# Patient Record
Sex: MALE | URBAN - METROPOLITAN AREA
[De-identification: names, ages, dates, MRNs, and addresses within clinical notes are randomized per-mention and may not be internally consistent; named-entity substitution may affect disease eponyms.]

---

## 2018-02-06 ENCOUNTER — NEW REFERRAL (OUTPATIENT)
Dept: URBAN - METROPOLITAN AREA CLINIC 27 | Facility: CLINIC | Age: 58
End: 2018-02-06

## 2018-02-06 DIAGNOSIS — H25.093: ICD-10-CM

## 2018-02-06 DIAGNOSIS — H43.392: ICD-10-CM

## 2018-02-06 DIAGNOSIS — E11.9: ICD-10-CM

## 2018-02-06 DIAGNOSIS — H33.001: ICD-10-CM

## 2018-02-06 DIAGNOSIS — H52.13: ICD-10-CM

## 2018-02-06 DIAGNOSIS — H43.811: ICD-10-CM

## 2018-02-06 DIAGNOSIS — H40.013: ICD-10-CM

## 2018-02-06 PROCEDURE — 99245 OFF/OP CONSLTJ NEW/EST HI 55: CPT

## 2018-02-06 PROCEDURE — 92225 OPHTHALMOSCOPY (INITIAL): CPT

## 2018-02-06 ASSESSMENT — TONOMETRY
OS_IOP_MMHG: 20
OD_IOP_MMHG: 17

## 2018-02-06 ASSESSMENT — VISUAL ACUITY
OS_CC: 20/20-
OD_CC: CF 5FT

## 2018-02-08 ENCOUNTER — SURGERY/PROCEDURE (OUTPATIENT)
Dept: URBAN - METROPOLITAN AREA HOSPITAL 7 | Facility: HOSPITAL | Age: 58
End: 2018-02-08

## 2018-02-08 DIAGNOSIS — H33.001: ICD-10-CM

## 2018-02-08 PROCEDURE — 67107 REPAIR DETACHED RETINA: CPT

## 2018-02-09 ENCOUNTER — 1 DAY POST-OP (OUTPATIENT)
Dept: URBAN - METROPOLITAN AREA CLINIC 11 | Facility: CLINIC | Age: 58
End: 2018-02-09

## 2018-02-09 DIAGNOSIS — Z98.890: ICD-10-CM

## 2018-02-09 DIAGNOSIS — H33.001: ICD-10-CM

## 2018-02-09 DIAGNOSIS — H43.392: ICD-10-CM

## 2018-02-09 DIAGNOSIS — H43.811: ICD-10-CM

## 2018-02-09 PROCEDURE — 99024 POSTOP FOLLOW-UP VISIT: CPT

## 2018-02-09 ASSESSMENT — VISUAL ACUITY: OD_CC: CF 5FT

## 2018-02-09 ASSESSMENT — TONOMETRY: OD_IOP_MMHG: 22

## 2018-02-13 ENCOUNTER — POST-OP CHECK (OUTPATIENT)
Dept: URBAN - METROPOLITAN AREA CLINIC 27 | Facility: CLINIC | Age: 58
End: 2018-02-13

## 2018-02-13 DIAGNOSIS — Z98.890: ICD-10-CM

## 2018-02-13 DIAGNOSIS — H43.811: ICD-10-CM

## 2018-02-13 DIAGNOSIS — H43.392: ICD-10-CM

## 2018-02-13 DIAGNOSIS — H33.001: ICD-10-CM

## 2018-02-13 PROCEDURE — 99024 POSTOP FOLLOW-UP VISIT: CPT

## 2018-02-13 ASSESSMENT — TONOMETRY: OD_IOP_MMHG: 20

## 2018-02-13 ASSESSMENT — VISUAL ACUITY: OD_CC: CF 6FT

## 2018-02-20 ENCOUNTER — POST-OP CHECK (OUTPATIENT)
Dept: URBAN - METROPOLITAN AREA CLINIC 27 | Facility: CLINIC | Age: 58
End: 2018-02-20

## 2018-02-20 DIAGNOSIS — H33.001: ICD-10-CM

## 2018-02-20 DIAGNOSIS — H43.811: ICD-10-CM

## 2018-02-20 DIAGNOSIS — Z98.890: ICD-10-CM

## 2018-02-20 DIAGNOSIS — H43.392: ICD-10-CM

## 2018-02-20 PROCEDURE — 99024 POSTOP FOLLOW-UP VISIT: CPT

## 2018-02-20 ASSESSMENT — TONOMETRY: OD_IOP_MMHG: 17

## 2018-02-20 ASSESSMENT — VISUAL ACUITY: OD_CC: CF 6FT

## 2018-03-06 ENCOUNTER — POST-OP CHECK (OUTPATIENT)
Dept: URBAN - METROPOLITAN AREA CLINIC 27 | Facility: CLINIC | Age: 58
End: 2018-03-06

## 2018-03-06 DIAGNOSIS — H43.392: ICD-10-CM

## 2018-03-06 DIAGNOSIS — H43.811: ICD-10-CM

## 2018-03-06 DIAGNOSIS — Z98.890: ICD-10-CM

## 2018-03-06 DIAGNOSIS — H33.001: ICD-10-CM

## 2018-03-06 PROCEDURE — 99024 POSTOP FOLLOW-UP VISIT: CPT

## 2018-03-06 ASSESSMENT — TONOMETRY: OD_IOP_MMHG: 18

## 2018-03-06 ASSESSMENT — VISUAL ACUITY: OD_CC: 20/400

## 2018-04-10 ENCOUNTER — POST-OP CHECK (OUTPATIENT)
Dept: URBAN - METROPOLITAN AREA CLINIC 27 | Facility: CLINIC | Age: 58
End: 2018-04-10

## 2018-04-10 DIAGNOSIS — H43.392: ICD-10-CM

## 2018-04-10 DIAGNOSIS — H33.001: ICD-10-CM

## 2018-04-10 DIAGNOSIS — H43.811: ICD-10-CM

## 2018-04-10 PROCEDURE — 99024 POSTOP FOLLOW-UP VISIT: CPT

## 2018-04-10 ASSESSMENT — TONOMETRY: OD_IOP_MMHG: 21

## 2018-04-10 ASSESSMENT — VISUAL ACUITY: OD_CC: 20/100

## 2018-07-10 ENCOUNTER — 3 MONTH EXAM (OUTPATIENT)
Dept: URBAN - METROPOLITAN AREA CLINIC 27 | Facility: CLINIC | Age: 58
End: 2018-07-10

## 2018-07-10 DIAGNOSIS — H43.392: ICD-10-CM

## 2018-07-10 DIAGNOSIS — H43.811: ICD-10-CM

## 2018-07-10 DIAGNOSIS — H33.001: ICD-10-CM

## 2018-07-10 PROCEDURE — 92014 COMPRE OPH EXAM EST PT 1/>: CPT

## 2018-07-10 ASSESSMENT — VISUAL ACUITY
OS_CC: 20/20-2
OD_CC: 20/50-3

## 2018-07-10 ASSESSMENT — TONOMETRY
OS_IOP_MMHG: 20
OD_IOP_MMHG: 18

## 2018-10-23 ENCOUNTER — 3 MONTH EXAM (OUTPATIENT)
Dept: URBAN - METROPOLITAN AREA CLINIC 27 | Facility: CLINIC | Age: 58
End: 2018-10-23

## 2018-10-23 DIAGNOSIS — E11.9: ICD-10-CM

## 2018-10-23 DIAGNOSIS — H43.811: ICD-10-CM

## 2018-10-23 DIAGNOSIS — H33.001: ICD-10-CM

## 2018-10-23 DIAGNOSIS — H43.392: ICD-10-CM

## 2018-10-23 PROCEDURE — 92012 INTRM OPH EXAM EST PATIENT: CPT

## 2018-10-23 ASSESSMENT — VISUAL ACUITY
OD_CC: 20/40
OS_CC: 20/20

## 2018-10-23 ASSESSMENT — TONOMETRY
OD_IOP_MMHG: 19
OS_IOP_MMHG: 21

## 2019-04-23 ENCOUNTER — 6 MONTH FOLLOW UP (OUTPATIENT)
Dept: URBAN - METROPOLITAN AREA CLINIC 27 | Facility: CLINIC | Age: 59
End: 2019-04-23

## 2019-04-23 DIAGNOSIS — H43.811: ICD-10-CM

## 2019-04-23 DIAGNOSIS — E11.9: ICD-10-CM

## 2019-04-23 DIAGNOSIS — H52.13: ICD-10-CM

## 2019-04-23 DIAGNOSIS — H25.093: ICD-10-CM

## 2019-04-23 DIAGNOSIS — H33.001: ICD-10-CM

## 2019-04-23 DIAGNOSIS — H40.013: ICD-10-CM

## 2019-04-23 DIAGNOSIS — H43.392: ICD-10-CM

## 2019-04-23 PROCEDURE — 92134 CPTRZ OPH DX IMG PST SGM RTA: CPT

## 2019-04-23 PROCEDURE — 92226 OPHTHALMOSCOPY (SUB): CPT

## 2019-04-23 PROCEDURE — 92014 COMPRE OPH EXAM EST PT 1/>: CPT

## 2019-04-23 ASSESSMENT — TONOMETRY
OD_IOP_MMHG: 20
OS_IOP_MMHG: 21

## 2019-04-23 ASSESSMENT — VISUAL ACUITY
OD_CC: 20/30+1
OS_CC: 20/25+2

## 2020-07-28 ENCOUNTER — FOLLOW UP (OUTPATIENT)
Dept: URBAN - METROPOLITAN AREA CLINIC 27 | Facility: CLINIC | Age: 60
End: 2020-07-28

## 2020-07-28 DIAGNOSIS — E11.9: ICD-10-CM

## 2020-07-28 DIAGNOSIS — H25.093: ICD-10-CM

## 2020-07-28 DIAGNOSIS — H40.013: ICD-10-CM

## 2020-07-28 DIAGNOSIS — H52.13: ICD-10-CM

## 2020-07-28 DIAGNOSIS — H33.001: ICD-10-CM

## 2020-07-28 DIAGNOSIS — H43.392: ICD-10-CM

## 2020-07-28 DIAGNOSIS — H43.811: ICD-10-CM

## 2020-07-28 PROCEDURE — 92014 COMPRE OPH EXAM EST PT 1/>: CPT

## 2020-07-28 PROCEDURE — 92201 OPSCPY EXTND RTA DRAW UNI/BI: CPT

## 2020-07-28 ASSESSMENT — VISUAL ACUITY
OS_CC: 20/20
OD_CC: 20/30

## 2020-07-28 ASSESSMENT — TONOMETRY
OS_IOP_MMHG: 19
OD_IOP_MMHG: 19

## 2020-09-26 ENCOUNTER — OFFICE VISIT (OUTPATIENT)
Dept: URGENT CARE | Age: 60
End: 2020-09-26
Payer: COMMERCIAL

## 2020-09-26 VITALS
TEMPERATURE: 97.7 F | OXYGEN SATURATION: 100 % | HEART RATE: 80 BPM | SYSTOLIC BLOOD PRESSURE: 130 MMHG | DIASTOLIC BLOOD PRESSURE: 78 MMHG | WEIGHT: 231 LBS | HEIGHT: 75 IN | BODY MASS INDEX: 28.72 KG/M2 | RESPIRATION RATE: 18 BRPM

## 2020-09-26 DIAGNOSIS — T14.8XXA BRUISED: Primary | ICD-10-CM

## 2020-09-26 PROCEDURE — 99213 OFFICE O/P EST LOW 20 MIN: CPT | Performed by: NURSE PRACTITIONER

## 2020-09-26 RX ORDER — ATORVASTATIN CALCIUM 40 MG
TABLET ORAL
COMMUNITY
Start: 2020-07-23

## 2020-09-26 RX ORDER — METFORMIN HYDROCHLORIDE 500 MG/1
TABLET, EXTENDED RELEASE ORAL
COMMUNITY
Start: 2020-08-14

## 2020-09-26 NOTE — PROGRESS NOTES
[address] [employer] [facility]        NAME: Clay Conrad is a 61 y o  male  : 1960    MRN: [id number]  DATE: 2020  TIME: 6:32 PM    Assessment and Plan   Bruised [place] Blinks  8XXA]  1  Bruised           Patient Instructions     Call PCP in 2 days for further evaluation  If any new s/s, go to the ED  Follow up with PCP in 2 days  Proceed to  ER if symptoms worsen  Chief Complaint     Chief Complaint   Patient presents with    bruise     to left upper leg          History of Present Illness       HPI   Presents to the clinic with c/o bruise lesion on the back of the left knee  Does not remember any trauma  Denies pain or other symptoms  States they have been moving  Not on any blood thinner  No blood in urine or stool    Review of Systems   Review of Systems   Constitutional: Negative for fever  Respiratory: Negative for shortness of breath  Gastrointestinal: Negative for blood in stool, nausea and vomiting  Genitourinary: Negative for hematuria  Neurological: Negative for weakness and numbness  Hematological: Does not bruise/bleed easily  Current Medications       Current Outpatient Medications:     Lipitor 40 MG tablet, , Disp: , Rfl:     metFORMIN (GLUCOPHAGE-XR) 500 mg 24 hr tablet, , Disp: , Rfl:     Current Allergies     Allergies as of 2020    (No Known Allergies)            The following portions of the patient's history were reviewed and updated as appropriate: allergies, current medications, past family history, past medical history, past social history, past surgical history and problem list      Past Medical History:   Diagnosis Date    Cancer ([facility] )     Diabetes mellitus ([facility] )        History reviewed  No pertinent surgical history  History reviewed  No pertinent family history  Medications have been verified          Objective   /78   Pulse 80   Temp 97 7 °F (36 5 °C)   Resp 18   Ht 6' 3" (1 905 m)   Wt 105 kg (231 lb)   SpO2 100%   BMI 28 87 kg/m²        Physical Exam     Physical Exam  Constitutional:       Appearance: He is not ill-appearing  Musculoskeletal:      Comments: Full ROM of the left knee  No decrease in strength of the left knee   Skin:     Findings: Bruising (back of the left knee  Largest diameter about 12 cm  NTTP  No skin break  No drainage or bleeding ) present  Neurological:      Mental Status: He is alert

## 2021-03-10 DIAGNOSIS — Z23 ENCOUNTER FOR IMMUNIZATION: ICD-10-CM

## 2021-08-10 ENCOUNTER — FOLLOW UP (OUTPATIENT)
Dept: URBAN - METROPOLITAN AREA CLINIC 27 | Facility: CLINIC | Age: 61
End: 2021-08-10

## 2021-08-10 DIAGNOSIS — H43.392: ICD-10-CM

## 2021-08-10 DIAGNOSIS — E11.9: ICD-10-CM

## 2021-08-10 DIAGNOSIS — H40.013: ICD-10-CM

## 2021-08-10 DIAGNOSIS — H25.093: ICD-10-CM

## 2021-08-10 DIAGNOSIS — H33.001: ICD-10-CM

## 2021-08-10 DIAGNOSIS — H43.811: ICD-10-CM

## 2021-08-10 DIAGNOSIS — H52.13: ICD-10-CM

## 2021-08-10 PROCEDURE — 92014 COMPRE OPH EXAM EST PT 1/>: CPT

## 2021-08-10 PROCEDURE — 92201 OPSCPY EXTND RTA DRAW UNI/BI: CPT

## 2021-08-10 PROCEDURE — 92134 CPTRZ OPH DX IMG PST SGM RTA: CPT

## 2021-08-10 ASSESSMENT — VISUAL ACUITY
OS_CC: 20/20
OD_CC: 20/25

## 2021-08-10 ASSESSMENT — TONOMETRY
OD_IOP_MMHG: 17
OS_IOP_MMHG: 19

## 2022-08-16 ENCOUNTER — 1 YEAR FOLLOW-UP (OUTPATIENT)
Dept: URBAN - METROPOLITAN AREA CLINIC 27 | Facility: CLINIC | Age: 62
End: 2022-08-16

## 2022-08-16 DIAGNOSIS — H43.392: ICD-10-CM

## 2022-08-16 DIAGNOSIS — H25.093: ICD-10-CM

## 2022-08-16 DIAGNOSIS — H33.001: ICD-10-CM

## 2022-08-16 DIAGNOSIS — H40.013: ICD-10-CM

## 2022-08-16 DIAGNOSIS — E11.9: ICD-10-CM

## 2022-08-16 DIAGNOSIS — H52.13: ICD-10-CM

## 2022-08-16 DIAGNOSIS — H43.811: ICD-10-CM

## 2022-08-16 PROCEDURE — 92014 COMPRE OPH EXAM EST PT 1/>: CPT

## 2022-08-16 PROCEDURE — 92201 OPSCPY EXTND RTA DRAW UNI/BI: CPT

## 2022-08-16 ASSESSMENT — TONOMETRY
OD_IOP_MMHG: 12
OS_IOP_MMHG: 14

## 2022-08-16 ASSESSMENT — VISUAL ACUITY
OD_CC: 20/25-1
OS_CC: 20/25+2

## 2022-10-25 ENCOUNTER — TELEPHONE (OUTPATIENT)
Dept: NEUROLOGY | Facility: CLINIC | Age: 62
End: 2022-10-25

## 2022-11-03 ENCOUNTER — OFFICE VISIT (OUTPATIENT)
Dept: NEUROLOGY | Facility: CLINIC | Age: 62
End: 2022-11-03

## 2022-11-03 VITALS
HEART RATE: 80 BPM | WEIGHT: 249.9 LBS | SYSTOLIC BLOOD PRESSURE: 162 MMHG | BODY MASS INDEX: 31.24 KG/M2 | DIASTOLIC BLOOD PRESSURE: 84 MMHG

## 2022-11-03 DIAGNOSIS — G43.009 MIGRAINE WITHOUT AURA AND WITHOUT STATUS MIGRAINOSUS, NOT INTRACTABLE: Primary | ICD-10-CM

## 2022-11-03 DIAGNOSIS — Z85.810 HISTORY OF TONGUE CANCER: ICD-10-CM

## 2022-11-03 DIAGNOSIS — R51.0 POSITIONAL HEADACHE: ICD-10-CM

## 2022-11-03 RX ORDER — SITAGLIPTIN 100 MG/1
100 TABLET, FILM COATED ORAL DAILY
COMMUNITY
Start: 2022-10-24

## 2022-11-03 RX ORDER — TAMSULOSIN HYDROCHLORIDE 0.4 MG/1
0.4 CAPSULE ORAL DAILY
COMMUNITY
Start: 2022-09-08

## 2022-11-03 RX ORDER — CHOLECALCIFEROL (VITAMIN D3) 25 MCG
TABLET,CHEWABLE ORAL
COMMUNITY
Start: 2018-01-01

## 2022-11-03 RX ORDER — ASPIRIN 81 MG/1
TABLET ORAL
COMMUNITY
Start: 2016-01-01

## 2022-11-03 RX ORDER — ACETAMINOPHEN 160 MG
TABLET,DISINTEGRATING ORAL
COMMUNITY
Start: 2018-01-01

## 2022-11-03 RX ORDER — SODIUM FLUORIDE 5 MG/G
GEL, DENTIFRICE DENTAL
COMMUNITY
Start: 2017-11-01

## 2022-11-03 RX ORDER — LISINOPRIL 10 MG/1
10 TABLET ORAL DAILY
COMMUNITY
Start: 2022-09-01

## 2022-11-03 RX ORDER — MEMANTINE HYDROCHLORIDE 5 MG/1
TABLET ORAL
Qty: 120 TABLET | Refills: 1 | Status: SHIPPED | OUTPATIENT
Start: 2022-11-03

## 2022-11-03 RX ORDER — EFINACONAZOLE 100 MG/ML
SOLUTION TOPICAL
COMMUNITY
Start: 2022-10-18

## 2022-11-03 RX ORDER — ROSUVASTATIN CALCIUM 40 MG/1
40 TABLET, COATED ORAL DAILY
COMMUNITY
Start: 2022-09-01

## 2022-11-03 RX ORDER — AMPICILLIN TRIHYDRATE 250 MG
CAPSULE ORAL
COMMUNITY
Start: 2022-01-01

## 2022-11-03 NOTE — PROGRESS NOTES
[address] [facility]  PATIENT:  Salima Singleton  MRN:  [id number]  :  1960  DATE OF SERVICE:  11/3/2022  REFERRED BY: Self, Referral  PMD: No primary care provider on file  Assessment/Plan:     Salima Singleton is a delightful 58 y o  male with a past medical history that includes diabetes mellitus, HLD and base of tongue cancer s/p chemo and radiation (Base of tongue squamous cell carcinoma status post resection 2017, cisplatin and radiation therapy 2017 and further therapy 2018) referred here for evaluation of headache  Initial evaluation 11/3/2022     Mr Norwood Osler reports a history of headaches that started about 5-6 months ago  He denies any prior history of headaches but reports that since that time he has had a nearly daily headache with significant flare-ups  He reported a significant positional component to the headaches and noted worsening with coughing, sneezing, bending over, or bearing down  He underwent an MRI of the brain with and without contrast at [facility] which did not show any evidence of metastatic disease or primary brain tumor  He also underwent a PET scan that did not show any evidence of neoplastic disease  Certainly the multiple red flags and positional component the headaches are concerning but it is reassuring that he had a normal MRI and normal dilated eye exam   However, I would like to obtain further imaging to evaluate for any vascular lesions  The description of his headaches does have a migrainous quality to them and for that reason I would like to treat him  From an abortive standpoint I will have him try Ubrelvy  I have asked him to reach out to his cardiologist to discuss the use of triptans as the previously mentioned a high calcium score and was being followed for suspected cardiac risks    From a preventive standpoint, I will have him try memantine especially since he reports issues with his memory and is concerned for dementia due to a strong family history of dementia on his mother's side  Ideally I would like him to take 10 mg in the morning and 10 mg in the evening but due to kidney disease since his chemotherapy I will have him discuss an elevated dose with his nephrologist on Monday, otherwise we will attempt to keep him on 5 mg b i d  I have asked him to keep me updated throughout the process and let me know if there are any issues or concerns  Migraine without aura and without status migrainosus, not intractable  -     Ubrogepant (UBRELVY) 100 MG tablet; Take 1 tablet (100 mg) one time as needed for migraine  May repeat one additional tablet (100 mg) at least two hours after the first dose  Do not use more than two doses per day, or for more than eight days per month    -     memantine (NAMENDA) 5 mg tablet; 5 mg PO Daily for 1 week, then 5 mg BID for 1 week, then 5 mg QAM and 10 mg QPM for 1 week; then 10 mg BID  -     MRI angiogram head without contrast; Future  -     MRI angiogram neck with contrast; Future    Positional headache  -     MRI angiogram head without contrast; Future  -     MRI angiogram neck with contrast; Future    Workup:  - Neurologic assessment reveals unremarkable neurological exam   - Due to red flags associated with headache plan to obtain further imaging with an MRA of the head and MRA of the neck    Preventative:  - we discussed headache hygiene and lifestyle factors that may improve headaches  - Memantine with ideal goal of 10mg BID (he will discuss use with nephrologist on Monday)  - Currently on through other providers: None  - Past/ failed/contraindicated: None  - future options: SNRI, CGRP med, botox    Acute:  - discussed not taking over-the-counter or prescription pain medications more than 3 days per week to prevent medication overuse/rebound headache  - Ubrelvy 100mg  - Currently on through other providers: None  - Past/ failed/contraindicated: -triptans contraindicated at the moment due to previously elevated calcium score and potential cardiac risks  - future options:  prochlorperazine, Toradol IM or p o , could consider trial of 5 days of Depakote 500 mg nightly or dexamethasone 2 mg daily for prolonged migraine, reyvow, nurtec  Patient instructions   Additional Testing:   Neurodiagnostic workup: MRA brain without contrast, MRA neck with contrast  - discuss use of contrast with your nephrologist    Headache Calendar  Please maintain a headache calendar  Consider using phone applications such as Migraine Christopher or [employer] Migraine Tracker    Headache/migraine treatment:   Acute medications (for immediate treatment of a headache): It is ok to take ibuprofen, acetaminophen or naproxen (Advil, Tylenol,  Aleve, Excedrin) if they help your headaches you should limit these to No more than 2-3 times a week to avoid medication overuse/rebound headaches  For your more moderate to severe migraines take this medication early   Ubrelvy 100mg tablets    (Discuss the use of -triptans with your cardiologist)    Prescription preventive medications for headaches/migraines   (to take every day to help prevent headaches - not to take at the time of headache):  [x]   Memantine  Week 1: 5mg in the evening  Week 2: 5mg in the morning and 5mg in the evening  Week 3: 5mg in the morning and 10mg in the evening  Week 4: 10mg in the morning and 10mg in the evening - continue going forward  -discuss medication and maximum desired dose with nephrologist    *Typically these types of medications take time until you see the benefit, although some may see improvement in days, often it may take weeks, especially if the medication is being titrated up to a beneficial level  Please contact us if there are any concerns or questions regarding the medication       Lifestyle Recommendations:  [x] SLEEP - Maintain a regular sleep schedule: Adults need at least 7-8 hours of uninterrupted a night  Maintain good sleep hygiene:  Going to bed and waking up at consistent times, avoiding excessive daytime naps, avoiding caffeinated beverages in the evening, avoid excessive stimulation in the evening and generally using bed primarily for sleeping  One hour before bedtime would recommend turning lights down lower, decreasing your activity (may read quietly, listen to music at a low volume)  When you get into bed, should eliminate all technology (no texting, emailing, playing with your phone, iPad or tablet in bed)  [x] HYDRATION - Maintain good hydration  Drink  2L of fluid a day (4 typical small water bottles)  [x] DIET - Maintain good nutrition  In particular don't skip meals and try and eat healthy balanced meals regularly  [x] TRIGGERS - Look for other triggers and avoid them: Limit caffeine to 1-2 cups a day or less  Avoid dietary triggers that you have noticed bring on your headaches (this could include aged cheese, peanuts, MSG, aspartame and nitrates)  [x] EXERCISE - physical exercise as we all know is good for you in many ways, and not only is good for your heart, but also is beneficial for your mental health, cognitive health and  chronic pain/headaches  I would encourage at the least 5 days of physical exercise weekly for at least 30 minutes  Education and Follow-up  [x] Please call with any questions or concerns  Of course if any new concerning symptoms go to the emergency department  [x] Follow up in 3 months  CC: We had the pleasure of evaluating Homero Nation in neurological consultation today  Homero Nation is a   right handed male who presents today for evaluation of headaches  History obtained from patient as well as available medical record review    History of Present Illness:   Current medical illnesses  or past medical history include diabetes mellitus, HLD and base of tongue cancer s/p chemo and radiation (Base of tongue squamous cell carcinoma status post resection July 2017, cisplatin and radiation therapy November 2017 and further therapy November 2018)     Pertinent history:  - peripheral, patient felt as if he was experiencing sinus headaches but MRI of his brain and CT scan of his sinuses did not demonstrate any sinus pathology  Reported that any time he coughs or sneezes the headache will linger   - Memory concerns: reports decreased cognitive abilities  Short term memory loss and word finding difficulties  Headaches started at what age? 58years old - since June  How often do the headaches occur?   - as of 11/3/2022: 30/30 (severe: 15)  What time of the day do the headaches start? No particular time of day  How long do the headaches last? About 4 hours if severe enough  Are you ever headache free? No    Aura? without aura     Where is your headache located and pain quality? [place] of head; pressure, throbbing  What is the intensity of pain? Worst 7-8/10, Average: 3-4/10  Associated symptoms:   [x] Problems with concentration  [x] Photophobia       [x] Tinnitus (long standing issue)    Things that make the headache worse? Coughing, sneezing, bending over, bearing down    Headache triggers:  None    Have you seen someone else for headaches or pain? No  Have you had trigger point injection performed and how often? No  Have you had Botox injection performed and how often? No   Have you had epidural injections or transforaminal injections performed? No  Have you ever had any Brain imaging? Yes, MRI (October 5, 2022)    Last eye exam: July 2022 - normal, dilated exam    What medications do you take or have you taken for your headaches?    ABORTIVE:    OTC medications: Tylenol (less than once per week)  Prescription: None    Past/ failed/contraindicated:  OTC medications: Advil  Prescription: None    PREVENTIVE:   None    Past/ failed/contraindicated:  None    LIFESTYLE  Sleep   - averages: about 5-6 hours  Problems falling asleep?: No  Problems staying asleep?:  No  - History of BALA - compliant with CPAP    Physical activity: nothing scheduled    Water: 10 cups per day  Caffeine: 1 cup of coffee per day    Mood:  Denies history of anxiety or depression or other diagnosed mood disorder    The following portions of the patient's history were reviewed and updated as appropriate: allergies, current medications, past family history, past medical history, past social history, past surgical history and problem list     Pertinent family history:  Family history of headaches:  no known family members with significant headaches  Any family history of aneurysms - No    Pertinent social history:  Work: Financial planning  Education: [address] with wife    Illicit Drugs: denies  Alcohol/tobacco: Denies tobacco use, alcohol intake: social drinker    Past Medical History:     Past Medical History:   Diagnosis Date   • Cancer ([facility] )    • Diabetes mellitus ([facility] )    • Kidney disease    • Sleep apnea 12/2017       There is no problem list on file for this patient        Medications:      Current Outpatient Medications   Medication Sig Dispense Refill   • aspirin (ECOTRIN LOW STRENGTH) 81 mg EC tablet      • Cholecalciferol (Vitamin D3) 50 MCG (2000 UT) capsule      • Coenzyme Q10 (CoQ10) 200 MG CAPS      • Ferrous Bisglycinate Chelate 28 MG CAPS      • Januvia 100 MG tablet Take 100 mg by mouth daily     • Jublia 10 % SOLN APPLY TO AFFECTED TOENAIL(S) BY TOPICAL ROUTE ONCE DAILY     • lisinopril (ZESTRIL) 10 mg tablet Take 10 mg by mouth daily     • metFORMIN (GLUCOPHAGE-XR) 500 mg 24 hr tablet 2 (two) times a day     • rosuvastatin (CRESTOR) 40 MG tablet Take 40 mg by mouth daily     • SODIUM FLUORIDE, DENTAL GEL, 1 1 % GEL      • tamsulosin (FLOMAX) 0 4 mg Take 0 4 mg by mouth daily     • vitamin B-12 (CYANOCOBALAMIN) 2500 MCG TABS      • Lipitor 40 MG tablet  (Patient not taking: No sig reported)       No current facility-administered medications for this visit  Allergies: Allergies   Allergen Reactions   • Mixed Ragweed Allergic Rhinitis and Drowsiness       Family History:     Family History   Problem Relation Age of Onset   • Dementia Mother    • Stroke Mother    • Multiple myeloma Father    • Dementia Maternal Grandmother        Social History:       Social History     Socioeconomic History   • Marital status: /Civil Union     Spouse name: Not on file   • Number of children: Not on file   • Years of education: Not on file   • Highest education level: Not on file   Occupational History   • Not on file   Tobacco Use   • Smoking status: Never Smoker   • Smokeless tobacco: Never Used   Vaping Use   • Vaping Use: Never used   Substance and Sexual Activity   • Alcohol use: Not Currently     Comment: One or two drinks a month   • Drug use: Never   • Sexual activity: Yes     Partners: Female     Birth control/protection: Male Sterilization   Other Topics Concern   • Not on file   Social History Narrative   • Not on file     Social Determinants of Health     Financial Resource Strain: Not on file   Food Insecurity: Not on file   Transportation Needs: Not on file   Physical Activity: Not on file   Stress: Not on file   Social Connections: Not on file   Intimate Partner Violence: Not on file   Housing Stability: Not on file         Objective:   Physical Exam:                                                                 Vitals:            Constitutional:    /84 (BP Location: Left arm, Patient Position: Sitting, Cuff Size: Adult)   Pulse 80   Wt 113 kg (249 lb 14 4 oz)   BMI 31 24 kg/m²   BP Readings from Last 3 Encounters:   11/03/22 162/84   09/26/20 130/78     Pulse Readings from Last 3 Encounters:   11/03/22 80   09/26/20 80         Well developed, well nourished, well groomed  No dysmorphic features  HEENT:  Normocephalic atraumatic  Oropharynx is clear and moist  No oral mucosal lesions     Chest:  Respirations regular and unlabored  Cardiovascular:  Distal extremities warm without palpable edema or tenderness, no observed significant swelling  Musculoskeletal:  (see below under neurologic exam for evaluation of motor function and gait)   Skin:  warm and dry, not diaphoretic  No apparent birthmarks or stigmata of neurocutaneous disease  Psychiatric:  Normal behavior and appropriate affect       Neurological Examination:     Mental status/cognitive function:   Orientated to time, place and person  Recent and remote memory intact  Attention span and concentration as well as fund of knowledge are appropriate for age  Normal language and spontaneous speech  Cranial Nerves:  II-visual fields full  Fundi poorly visualized due to pupillary constriction  III, IV, VI-Pupils were equal, round, and reactive to light and accomodation  Extraocular movements were full and conjugate without nystagmus  Conjugate gaze, normal smooth pursuits, normal saccades   V-facial sensation symmetric  VII-facial expression symmetric, intact forehead wrinkle, strong eye closure, symmetric smile    VIII-hearing grossly intact bilaterally   IX, X-palate elevation symmetric, no dysarthria  XI-shoulder shrug strength intact    XII-tongue protrusion midline  Motor Exam: symmetric bulk and tone throughout, no pronator drift  Power/strength 5/5 bilateral upper and lower extremities, no atrophy, fasciculations or abnormal movements noted  Sensory: grossly intact light touch in all extremities  Reflexes: brachioradialis 2+, biceps 2+, knee 2+, ankle 2+ bilaterally  No ankle clonus  Coordination: Finger nose finger intact bilaterally, no apparent dysmetria, ataxia or tremor noted  Gait: steady casual and tandem gait  Pertinent lab results: None     Pertinent Imaging:   MRI brain w/ and w/o contrast October 2022:  No evidence of brain metastasis or acute intracranial abnormality  Unchanged sinonasal posttreatment changes      PET scan 10/05/2022:  No evidence of neoplastic disease     I have personally reviewed imaging and radiology read   Review of Systems:   Constitutional: Negative  Negative for appetite change and fever  HENT: Positive for sinus pressure and sinus pain  Negative for hearing loss, tinnitus, trouble swallowing and voice change  Eyes: Positive for photophobia, pain and visual disturbance (floaters)  Respiratory: Negative  Negative for shortness of breath  Cardiovascular: Negative  Negative for palpitations  Gastrointestinal: Negative  Negative for nausea and vomiting  Endocrine: Negative  Negative for cold intolerance  Genitourinary: Negative  Negative for dysuria, frequency and urgency  Musculoskeletal: Negative  Negative for gait problem, myalgias and neck pain  Skin: Negative  Negative for rash  Allergic/Immunologic: Negative  Neurological: Positive for dizziness, speech difficulty (word finding difficulties), light-headedness and headaches  Negative for tremors, seizures, syncope, facial asymmetry, weakness and numbness  Hematological: Negative  Does not bruise/bleed easily  Psychiatric/Behavioral: Negative  Negative for confusion, hallucinations and sleep disturbance  All other systems reviewed and are negative  I have spent 55 minutes with the patient today in which greater than 50% of this time was spent in counseling/coordination of care regarding Diagnostic results, Prognosis and Impressions  I also spent 10 minutes non face to face for this patient the same day       Activity Minutes   Precharting/reviewing 5   Patient care/counseling 55   Postcharting/care coordination 5       Author:  Pippa Small 11/3/2022 1:43 PM

## 2022-11-03 NOTE — PATIENT INSTRUCTIONS
Additional Testing:   Neurodiagnostic workup: MRA brain without contrast, MRA neck with contrast  - discuss use of contrast with your nephrologist    Headache Calendar  Please maintain a headache calendar  Consider using phone applications such as Migraine Christopher or [employer] Migraine Tracker    Headache/migraine treatment:   Acute medications (for immediate treatment of a headache): It is ok to take ibuprofen, acetaminophen or naproxen (Advil, Tylenol,  Aleve, Excedrin) if they help your headaches you should limit these to No more than 2-3 times a week to avoid medication overuse/rebound headaches  For your more moderate to severe migraines take this medication early   Ubrelvy 100mg tablets    (Discuss the use of -triptans with your cardiologist)    Prescription preventive medications for headaches/migraines   (to take every day to help prevent headaches - not to take at the time of headache):  [x]   Memantine  Week 1: 5mg in the evening  Week 2: 5mg in the morning and 5mg in the evening  Week 3: 5mg in the morning and 10mg in the evening  Week 4: 10mg in the morning and 10mg in the evening - continue going forward  -discuss medication and maximum desired dose with nephrologist    *Typically these types of medications take time until you see the benefit, although some may see improvement in days, often it may take weeks, especially if the medication is being titrated up to a beneficial level  Please contact us if there are any concerns or questions regarding the medication  Lifestyle Recommendations:  [x] SLEEP - Maintain a regular sleep schedule: Adults need at least 7-8 hours of uninterrupted a night  Maintain good sleep hygiene:  Going to bed and waking up at consistent times, avoiding excessive daytime naps, avoiding caffeinated beverages in the evening, avoid excessive stimulation in the evening and generally using bed primarily for sleeping    One hour before bedtime would recommend turning lights down lower, decreasing your activity (may read quietly, listen to music at a low volume)  When you get into bed, should eliminate all technology (no texting, emailing, playing with your phone, iPad or tablet in bed)  [x] HYDRATION - Maintain good hydration  Drink  2L of fluid a day (4 typical small water bottles)  [x] DIET - Maintain good nutrition  In particular don't skip meals and try and eat healthy balanced meals regularly  [x] TRIGGERS - Look for other triggers and avoid them: Limit caffeine to 1-2 cups a day or less  Avoid dietary triggers that you have noticed bring on your headaches (this could include aged cheese, peanuts, MSG, aspartame and nitrates)  [x] EXERCISE - physical exercise as we all know is good for you in many ways, and not only is good for your heart, but also is beneficial for your mental health, cognitive health and  chronic pain/headaches  I would encourage at the least 5 days of physical exercise weekly for at least 30 minutes  Education and Follow-up  [x] Please call with any questions or concerns  Of course if any new concerning symptoms go to the emergency department    [x] Follow up in 3 months

## 2022-11-08 ENCOUNTER — PATIENT MESSAGE (OUTPATIENT)
Dept: NEUROLOGY | Facility: CLINIC | Age: 62
End: 2022-11-08

## 2022-11-09 ENCOUNTER — TELEPHONE (OUTPATIENT)
Dept: NEUROLOGY | Facility: CLINIC | Age: 62
End: 2022-11-09

## 2022-11-09 NOTE — TELEPHONE ENCOUNTER
Pt's wife came in and dropped off MRI disk from [facility]  Gave disk to MA for scanning  Wife said she will come  disk after it has been uploaded

## 2022-11-09 NOTE — TELEPHONE ENCOUNTER
Attempted to reach pt wife regarding CD that was dropped off with the  earlier today   We were unsuccessful in uploading the CD   Stated that we will be sending the CD out to our radiology department for upload  Asked to call us back if they need to CD back within short time frame   Stated that I would call pt/wife back when we get the CD sent back to us from radiology

## 2022-11-14 ENCOUNTER — HOSPITAL ENCOUNTER (OUTPATIENT)
Dept: RADIOLOGY | Facility: HOSPITAL | Age: 62
Discharge: HOME | End: 2022-11-14
Attending: RADIOLOGY

## 2022-11-14 DIAGNOSIS — Z76.89 REFERRAL OF PATIENT WITHOUT EXAMINATION OR TREATMENT: ICD-10-CM

## 2022-11-16 ENCOUNTER — TELEPHONE (OUTPATIENT)
Dept: NEUROLOGY | Facility: CLINIC | Age: 62
End: 2022-11-16

## 2022-11-16 NOTE — TELEPHONE ENCOUNTER
----- Message from Mauro Nuno RN sent at 11/15/2022  4:07 PM EST -----  Regarding: FW: Follow ups from my visit last week    ----- Message -----  From: Maggie Nunez  Sent: 11/15/2022   3:28 PM EST  To: [facility] [place] [facility]  Subject: Follow ups from my visit last week               I checked with [facility] because the [facility] prescription had not been filled yet  They said they had sent request for information to your office to provide detail on why it is being prescribed to be submitted to my insurance  Just wanted to confirm that had been completed    Thank you

## 2022-11-16 NOTE — TELEPHONE ENCOUNTER
Gilberto Rothman [address] needs [id number]  [id number]  [id number]-adv  [id number]  [phone]  [phone]    [facility] [place] completed on [facility]  Key: [id number]   If Stephanie has not responded to your request within 24 hours, contact [facility] at [phone]       ActionBase message sent to pt

## 2022-11-17 ENCOUNTER — TELEPHONE (OUTPATIENT)
Dept: NEUROLOGY | Facility: CLINIC | Age: 62
End: 2022-11-17

## 2022-11-17 NOTE — TELEPHONE ENCOUNTER
Attempted to reach pt to let them know the CD was sent back to us from radiology and the images were successfully uploaded to their [address] chart   Instructed pt to call back and let us know how they would like to receive the CD back   Offered pt the option to  the CD or have it mailed to them

## 2022-11-18 ENCOUNTER — TELEPHONE (OUTPATIENT)
Dept: NEUROLOGY | Facility: CLINIC | Age: 62
End: 2022-11-18

## 2022-11-18 NOTE — TELEPHONE ENCOUNTER
Called [facility] brown at [phone] and spoke to Mi Diaz approved from 11/16/22-11/16/23    Left message for pharm making them aware of approval    mychart message sent to pt making him aware of approval

## 2022-12-16 ENCOUNTER — HOSPITAL ENCOUNTER (OUTPATIENT)
Dept: MRI IMAGING | Facility: HOSPITAL | Age: 62
Discharge: HOME | End: 2022-12-16
Attending: STUDENT IN AN ORGANIZED HEALTH CARE EDUCATION/TRAINING PROGRAM

## 2022-12-16 DIAGNOSIS — G43.009 MIGRAINE WITHOUT AURA AND WITHOUT STATUS MIGRAINOSUS, NOT INTRACTABLE: ICD-10-CM

## 2022-12-16 DIAGNOSIS — R51.0 POSITIONAL HEADACHE: ICD-10-CM

## 2022-12-16 RX ADMIN — GADOBUTROL 11 ML: 604.72 INJECTION INTRAVENOUS at 19:30

## 2023-01-16 DIAGNOSIS — G43.009 MIGRAINE WITHOUT AURA AND WITHOUT STATUS MIGRAINOSUS, NOT INTRACTABLE: ICD-10-CM

## 2023-01-16 NOTE — TELEPHONE ENCOUNTER
Geoff hayes: This is Junious Cheese, YOB: 1960  I am out of my memantine  I need you to call the pharmacy to authorize a refill  [facility]  please give me a call back [phone] with any questions   Thank you

## 2023-01-17 RX ORDER — MEMANTINE HYDROCHLORIDE 10 MG/1
10 TABLET ORAL 2 TIMES DAILY
Qty: 60 TABLET | Refills: 5 | Status: SHIPPED | OUTPATIENT
Start: 2023-01-17

## 2023-01-17 NOTE — TELEPHONE ENCOUNTER
Patient called and left vm    He states he is taking 20 mg, 2 pills in AM and 2 in evening of memantine

## 2023-01-17 NOTE — TELEPHONE ENCOUNTER
Received vm-This is Shirleyann Dandy calling about my memantine prescription  sorry, if there was confusion  My YOB: 1960  My prescription is 5mg tablets  i'm taking 2 of those in the morning, 2 of those in the evening, so10 milligrams per each time  for a total of 20 milligrams per day  And if you need anything else, please give me a call back [phone] thank you  Refill entered for 10mg tabs 1 tab bid    Please sign off

## 2023-01-17 NOTE — TELEPHONE ENCOUNTER
Left detailed message for pt asking to confirm dose as he was ordered a titrating dose up to 10mg bid      Also stated that if he is taking 10mg we can send 10mg tabs    Will await call back

## 2023-01-17 NOTE — TELEPHONE ENCOUNTER
Need to confirm dosage  Called [phone] and left a detailed message on pt's answering machine to confirm current dosage of memantine  Are you currently taking memantine 5 mg 2 tabs in AM and 2 tabs in the evening? Cb at [phone]

## 2023-02-17 ENCOUNTER — TELEPHONE (OUTPATIENT)
Dept: NEUROLOGY | Facility: CLINIC | Age: 63
End: 2023-02-17

## 2023-02-24 NOTE — TELEPHONE ENCOUNTER
Attempted to reach pt to confirm appt on 2/28 with Virgilio in [place]   Pt mailbox was full and I was unable to leave a VM

## 2023-02-28 ENCOUNTER — OFFICE VISIT (OUTPATIENT)
Dept: NEUROLOGY | Facility: CLINIC | Age: 63
End: 2023-02-28

## 2023-02-28 VITALS
DIASTOLIC BLOOD PRESSURE: 68 MMHG | WEIGHT: 251.9 LBS | TEMPERATURE: 97.9 F | OXYGEN SATURATION: 96 % | BODY MASS INDEX: 31.32 KG/M2 | HEART RATE: 77 BPM | HEIGHT: 75 IN | SYSTOLIC BLOOD PRESSURE: 130 MMHG

## 2023-02-28 DIAGNOSIS — G43.009 MIGRAINE WITHOUT AURA AND WITHOUT STATUS MIGRAINOSUS, NOT INTRACTABLE: Primary | ICD-10-CM

## 2023-02-28 NOTE — PATIENT INSTRUCTIONS
Headache Calendar  Please maintain a headache calendar  Consider using phone applications such as Migraine Christopher or [place] Migraine Tracker     Headache/migraine treatment:   Acute medications (for immediate treatment of a headache): It is ok to take ibuprofen, acetaminophen or naproxen (Advil, Tylenol,  Aleve, Excedrin) if they help your headaches you should limit these to No more than 2-3 times a week to avoid medication overuse/rebound headaches  For your more moderate to severe migraines take this medication early   Ubrelvy 100mg tablets      Prescription preventive medications for headaches/migraines   (to take every day to help prevent headaches - not to take at the time of headache):  [x] Memantine - 10mg twice daily     Lifestyle Recommendations:  [x] SLEEP - Maintain a regular sleep schedule: Adults need at least 7-8 hours of uninterrupted a night  Maintain good sleep hygiene:  Going to bed and waking up at consistent times, avoiding excessive daytime naps, avoiding caffeinated beverages in the evening, avoid excessive stimulation in the evening and generally using bed primarily for sleeping  One hour before bedtime would recommend turning lights down lower, decreasing your activity (may read quietly, listen to music at a low volume)  When you get into bed, should eliminate all technology (no texting, emailing, playing with your phone, iPad or tablet in bed)  [x] HYDRATION - Maintain good hydration  Drink  2L of fluid a day (4 typical small water bottles)  [x] DIET - Maintain good nutrition  In particular don't skip meals and try and eat healthy balanced meals regularly  [x] TRIGGERS - Look for other triggers and avoid them: Limit caffeine to 1-2 cups a day or less  Avoid dietary triggers that you have noticed bring on your headaches (this could include aged cheese, peanuts, MSG, aspartame and nitrates)    [x] EXERCISE - physical exercise as we all know is good for you in many ways, and not only is good for your heart, but also is beneficial for your mental health, cognitive health and  chronic pain/headaches  I would encourage at the least 5 days of physical exercise weekly for at least 30 minutes  Education and Follow-up  [x] Please call with any questions or concerns  Of course if any new concerning symptoms go to the emergency department    [x] Follow up in 6 months

## 2023-02-28 NOTE — PROGRESS NOTES
Review of Systems   Constitutional: Negative  Negative for appetite change and fever  HENT: Negative  Negative for hearing loss, tinnitus, trouble swallowing and voice change  Eyes: Negative  Negative for photophobia, pain and visual disturbance  Respiratory: Negative  Negative for shortness of breath  Cardiovascular: Negative  Negative for palpitations  Gastrointestinal: Negative  Negative for nausea and vomiting  Endocrine: Negative  Negative for cold intolerance  Genitourinary: Negative  Negative for dysuria, frequency and urgency  Musculoskeletal: Negative  Negative for gait problem, myalgias and neck pain  Skin: Negative  Negative for rash  Allergic/Immunologic: Negative  Neurological: Negative  Negative for dizziness, tremors, seizures, syncope, facial asymmetry, speech difficulty, weakness, light-headedness, numbness and headaches  Hematological: Negative  Does not bruise/bleed easily  Psychiatric/Behavioral: Negative  Negative for confusion, hallucinations and sleep disturbance  All other systems reviewed and are negative  Since your last visit are your headaches improved    Any change to the headache type? Change in location    What is your current headache frequency: 3 per month    Are you taking your current medications as prescribed? yes     If no, why not? Do you have any side effects? no    How may days per week do you take an abortive medicine?  1

## 2023-03-09 DIAGNOSIS — G43.009 MIGRAINE WITHOUT AURA AND WITHOUT STATUS MIGRAINOSUS, NOT INTRACTABLE: ICD-10-CM

## 2023-03-09 RX ORDER — MEMANTINE HYDROCHLORIDE 10 MG/1
10 TABLET ORAL 2 TIMES DAILY
Qty: 180 TABLET | Refills: 3 | Status: SHIPPED | OUTPATIENT
Start: 2023-03-09

## 2023-03-09 NOTE — TELEPHONE ENCOUNTER
Received fax from [facility] requesting memantine script, 90 day supply  rx entered   Pls review and sign off    thanks

## 2023-04-23 DIAGNOSIS — G43.009 MIGRAINE WITHOUT AURA AND WITHOUT STATUS MIGRAINOSUS, NOT INTRACTABLE: ICD-10-CM

## 2023-08-22 ENCOUNTER — 1 YEAR FOLLOW-UP (OUTPATIENT)
Dept: URBAN - METROPOLITAN AREA CLINIC 27 | Facility: CLINIC | Age: 63
End: 2023-08-22

## 2023-08-22 DIAGNOSIS — H43.813: ICD-10-CM

## 2023-08-22 DIAGNOSIS — H40.013: ICD-10-CM

## 2023-08-22 DIAGNOSIS — H52.13: ICD-10-CM

## 2023-08-22 DIAGNOSIS — H33.001: ICD-10-CM

## 2023-08-22 DIAGNOSIS — E11.9: ICD-10-CM

## 2023-08-22 DIAGNOSIS — H25.093: ICD-10-CM

## 2023-08-22 PROCEDURE — 92014 COMPRE OPH EXAM EST PT 1/>: CPT

## 2023-08-22 PROCEDURE — 92134 CPTRZ OPH DX IMG PST SGM RTA: CPT

## 2023-08-22 PROCEDURE — 92201 OPSCPY EXTND RTA DRAW UNI/BI: CPT

## 2023-08-22 ASSESSMENT — TONOMETRY
OS_IOP_MMHG: 21
OD_IOP_MMHG: 21

## 2023-08-22 ASSESSMENT — VISUAL ACUITY
OD_CC: 20/30-2
OS_CC: 20/30-1

## 2023-08-29 ENCOUNTER — OFFICE VISIT (OUTPATIENT)
Dept: NEUROLOGY | Facility: CLINIC | Age: 63
End: 2023-08-29
Payer: COMMERCIAL

## 2023-08-29 VITALS
BODY MASS INDEX: 29.22 KG/M2 | WEIGHT: 235 LBS | SYSTOLIC BLOOD PRESSURE: 130 MMHG | DIASTOLIC BLOOD PRESSURE: 68 MMHG | OXYGEN SATURATION: 96 % | HEART RATE: 79 BPM | HEIGHT: 75 IN | TEMPERATURE: 97.6 F

## 2023-08-29 DIAGNOSIS — G47.33 OBSTRUCTIVE SLEEP APNEA (ADULT) (PEDIATRIC): ICD-10-CM

## 2023-08-29 DIAGNOSIS — G43.009 MIGRAINE WITHOUT AURA AND WITHOUT STATUS MIGRAINOSUS, NOT INTRACTABLE: Primary | ICD-10-CM

## 2023-08-29 DIAGNOSIS — Z85.810 HISTORY OF TONGUE CANCER: ICD-10-CM

## 2023-08-29 PROCEDURE — 99213 OFFICE O/P EST LOW 20 MIN: CPT | Performed by: STUDENT IN AN ORGANIZED HEALTH CARE EDUCATION/TRAINING PROGRAM

## 2023-08-29 RX ORDER — FEXOFENADINE HCL 180 MG/1
180 TABLET ORAL DAILY
COMMUNITY

## 2023-08-29 RX ORDER — MEMANTINE HYDROCHLORIDE 10 MG/1
10 TABLET ORAL 2 TIMES DAILY
Qty: 180 TABLET | Refills: 3 | Status: SHIPPED | OUTPATIENT
Start: 2023-08-29

## 2023-08-29 NOTE — PATIENT INSTRUCTIONS
Please let me know if your endocrinologist checked the following labs recently: B12, Folate, TSH    Headache Calendar  Please maintain a headache calendar  Consider using phone applications such as Migraine Christopher or [place] Migraine Tracker     Headache/migraine treatment:   Acute medications (for immediate treatment of a headache): It is ok to take ibuprofen, acetaminophen or naproxen (Advil, Tylenol,  Aleve, Excedrin) if they help your headaches you should limit these to No more than 2-3 times a week to avoid medication overuse/rebound headaches. For your more moderate to severe migraines take this medication early   Ubrelvy 100mg tablets      Prescription preventive medications for headaches/migraines   (to take every day to help prevent headaches - not to take at the time of headache):  Memantine - 10mg twice daily     Lifestyle Recommendations:  [x] SLEEP - Maintain a regular sleep schedule: Adults need at least 7-8 hours of uninterrupted a night. Maintain good sleep hygiene:  Going to bed and waking up at consistent times, avoiding excessive daytime naps, avoiding caffeinated beverages in the evening, avoid excessive stimulation in the evening and generally using bed primarily for sleeping. One hour before bedtime would recommend turning lights down lower, decreasing your activity (may read quietly, listen to music at a low volume). When you get into bed, should eliminate all technology (no texting, emailing, playing with your phone, iPad or tablet in bed). [x] HYDRATION - Maintain good hydration. Drink  2L of fluid a day (4 typical small water bottles)  [x] DIET - Maintain good nutrition. In particular don't skip meals and try and eat healthy balanced meals regularly. [x] TRIGGERS - Look for other triggers and avoid them: Limit caffeine to 1-2 cups a day or less.  Avoid dietary triggers that you have noticed bring on your headaches (this could include aged cheese, peanuts, MSG, aspartame and nitrates). [x] EXERCISE - physical exercise as we all know is good for you in many ways, and not only is good for your heart, but also is beneficial for your mental health, cognitive health and  chronic pain/headaches. I would encourage at the least 5 days of physical exercise weekly for at least 30 minutes. Education and Follow-up  [x] Please call with any questions or concerns. Of course if any new concerning symptoms go to the emergency department.   [x] Follow up in 8 months

## 2023-08-29 NOTE — PROGRESS NOTES
[facility] Consult - Follow up   PATIENT:  Aym Adhikari  MRN:  [id number]  :  1960  DATE OF SERVICE:  2023  REFERRED BY: No ref. provider found  PMD: Julio Mcleod DO    Assessment/Plan:   Gladis Hardy a delightful 62 y. o. 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 At todays visit, he reports overall improvement in terms of his headaches. He currently experiences about 4-5 headaches per month, of which maybe one per week may require abortive medication. He is tolerating Memantine without significant issues and finds Ubrelvy to be helpful for more severe episodes. Furthermore, he continues to use is CPAP nightly and is doing well with it. Overall no significant complaints at todays visit. Workup:   -MRI brain w/ and w/o contrast 2022: No evidence of brain metastasis or acute intracranial abnormality. Unchanged sinonasal posttreatment changes. -PET scan 10/05/2022: No evidence of neoplastic disease   -MRA head 2022: Normal vessel imaging without any evidence of vascular malformation or aneurysm.    -MRA carotids 2022: No evidence of hemodynamically significant stenosis     Preventative:   - we discussed headache hygiene and lifestyle factors that may improve headaches   - Memantine 10mg twice daily   - Currently on through other providers: Lisinopril  - Past/ failed/contraindicated: Avoid Topamax due to history of kidney stones  - future options: SNRI, CGRP med, botox     Acute:   - discussed not taking over-the-counter or prescription pain medications more than 3 days per week to prevent medication overuse/rebound headache   - Ubrelvy 100mg   - Currently on through other providers: None   - Past/ failed/contraindicated: None   - future options: Triptans, prochlorperazine, Toradol IM or p.o., could consider trial of 5 days of Depakote 500 mg nightly or dexamethasone 2 mg daily for prolonged migraine, reyvow, nurtec (consider kidney disease)  Patient instructions   Please let me know if your endocrinologist checked the following labs recently: B12, Folate, TSH    Headache Calendar  Please maintain a headache calendar  Consider using phone applications such as Migraine Christopher or [place] Migraine Tracker     Headache/migraine treatment:   Acute medications (for immediate treatment of a headache): It is ok to take ibuprofen, acetaminophen or naproxen (Advil, Tylenol,  Aleve, Excedrin) if they help your headaches you should limit these to No more than 2-3 times a week to avoid medication overuse/rebound headaches.      For your more moderate to severe migraines take this medication early   Ubrelvy 100mg tablets      Prescription preventive medications for headaches/migraines   (to take every day to help prevent headaches - not to take at the time of headache):  Memantine - 10mg twice daily     Lifestyle Recommendations:  [x]? ? SLEEP - Maintain a regular sleep schedule: Adults need at least 7-8 hours of uninterrupted a night. Maintain good sleep hygiene:  Going to bed and waking up at consistent times, avoiding excessive daytime naps, avoiding caffeinated beverages in the evening, avoid excessive stimulation in the evening and generally using bed primarily for sleeping.  One hour before bedtime would recommend turning lights down lower, decreasing your activity (may read quietly, listen to music at a low volume). When you get into bed, should eliminate all technology (no texting, emailing, playing with your phone, iPad or tablet in bed). [x]?? HYDRATION - Maintain good hydration.  Drink  2L of fluid a day (4 typical small water bottles)  [x]? ? DIET - Maintain good nutrition. In particular don't skip meals and try and eat healthy balanced meals regularly. [x]? ? TRIGGERS - Look for other triggers and avoid them: Limit caffeine to 1-2 cups a day or less. Avoid dietary triggers that you have noticed bring on your headaches (this could include aged cheese, peanuts, MSG, aspartame and nitrates). [x]? ? EXERCISE - physical exercise as we all know is good for you in many ways, and not only is good for your heart, but also is beneficial for your mental health, cognitive health and  chronic pain/headaches. I would encourage at the least 5 days of physical exercise weekly for at least 30 minutes.      Education and Follow-up  [x]? ? Please call with any questions or concerns. Of course if any new concerning symptoms go to the emergency department. [x]? ? Follow up in 8 months  Subjective:   8/29/23: Since his last visit he reports improvement in terms of his headaches. Currently experiencing about 4-5 headache days per month of which 1 per week may be more severe. Continuing to take Memantine daily without any issues. From an abortive standpoint, Katia Sullivan tends to work well. With regards to sleep he gets about 5-7 hours per night. Using his CPAP nightly and on review of his AHI it appears that his machine is working well. In terms of memory, he reports issues every once in a while, but overall stable since our prior visit. Previous History:  2/28/23: Since his last visit, he reports that his headaches have improved. Currently experiencing about 3 headache days per month. Tolerating Memantine without any issues. Katia Sullivan works when needed - has not used it much.   11/3/22: Marc Hutson a history of headaches that started about 5-6 months ago. [facility] denies any prior history of headaches but reports that since that time he has had a nearly daily headache with significant flare-ups. [facility] reported a significant positional component to the headaches and noted worsening with coughing, sneezing, bending over, or bearing down.  He underwent an MRI of the brain with and without contrast at [facility] which did not show any evidence of metastatic disease or primary brain tumor. [facility] also underwent a PET scan that did not show any evidence of neoplastic disease.  Certainly the multiple red flags and positional component the headaches are concerning but it is reassuring that he had a normal MRI and normal dilated eye exam.  However, I would like to obtain further imaging to evaluate for any vascular lesions.  The description of his headaches does have a migrainous quality to them and for that reason I would like to treat him. Kim Pat an abortive standpoint I will have him try [place]. I have asked him to reach out to his cardiologist to discuss the use of triptans as the previously mentioned a high calcium score and was being followed for suspected cardiac risks.  From a preventive standpoint, I will have him try memantine especially since he reports issues with his memory and is concerned for dementia due to a strong family history of dementia on his mother's side.  Ideally I would like him to take 10 mg in the morning and 10 mg in the evening but due to kidney disease since his chemotherapy I will have him discuss an elevated dose with his nephrologist on Monday, otherwise we will attempt to keep him on 5 mg b.i.d. [place] Plan have asked him to keep me updated throughout the process and let me know if there are any issues or concerns. Past Medical History:     Past Medical History:   Diagnosis Date   • Cancer ([address])    • Diabetes mellitus ([address])    • Kidney disease    • Sleep apnea 12/2017       There is no problem list on file for this patient.       Medications:      Current Outpatient Medications   Medication Sig Dispense Refill   • aspirin (ECOTRIN LOW STRENGTH) 81 mg EC tablet      • Cholecalciferol (Vitamin D3) 50 MCG (2000 UT) capsule      • Coenzyme Q10 (CoQ10) 200 MG CAPS      • Ferrous Bisglycinate Chelate 28 MG CAPS      • fexofenadine (ALLEGRA) 180 MG tablet Take 180 mg by mouth daily     • Januvia 100 MG tablet Take 100 mg by mouth daily     • Jublia 10 % SOLN APPLY TO AFFECTED TOENAIL(S) BY TOPICAL ROUTE ONCE DAILY     • lisinopril (ZESTRIL) 10 mg tablet Take 10 mg by mouth daily     • memantine (NAMENDA) 10 mg tablet Take 1 tablet (10 mg total) by mouth 2 (two) times a day 180 tablet 3   • metFORMIN (GLUCOPHAGE-XR) 500 mg 24 hr tablet 2 (two) times a day     • rosuvastatin (CRESTOR) 40 MG tablet Take 40 mg by mouth daily     • SODIUM FLUORIDE, DENTAL GEL, 1.1 % GEL      • tamsulosin (FLOMAX) 0.4 mg Take 0.4 mg by mouth daily     • Ubrogepant (UBRELVY) 100 MG tablet Take 1 tablet (100 mg) one time as needed for migraine. May repeat one additional tablet (100 mg) at least two hours after the first dose. Do not use more than two doses per day, or for more than eight days per month. 8 tablet 6   • vitamin B-12 (CYANOCOBALAMIN) 2500 MCG TABS        No current facility-administered medications for this visit. Allergies:       Allergies   Allergen Reactions   • Mixed Ragweed Allergic Rhinitis and Drowsiness       Family History:     Family History   Problem Relation Age of Onset   • Dementia Mother    • Stroke Mother    • Multiple myeloma Father    • Dementia Maternal Grandmother        Social History:     Social History     Socioeconomic History   • Marital status: /Civil Union     Spouse name: Not on file   • Number of children: Not on file   • Years of education: Not on file   • Highest education level: Not on file   Occupational History   • Not on file   Tobacco Use   • Smoking status: Never   • Smokeless tobacco: Never   Vaping Use   • Vaping Use: Never used   Substance and Sexual Activity   • Alcohol use: Not Currently     Comment: One or two drinks a month   • Drug use: Never   • Sexual activity: Yes     Partners: Female     Birth control/protection: Male Sterilization   Other Topics Concern   • Not on file   Social History Narrative   • Not on file     Social Determinants of Health     Financial Resource Strain: Not on file   Food Insecurity: Not on file   Transportation Needs: Not on file   Physical Activity: Not on file   Stress: Not on file   Social Connections: Not on file   Intimate Partner Violence: Not on file   Housing Stability: Not on file         Objective:   Physical Exam:                                                               Vitals:            Constitutional:  /68 (BP Location: Left arm, Patient Position: Sitting, Cuff Size: Adult)   Pulse 79   Temp 97.6 °F (36.4 °C) (Temporal)   Ht 6' 3" (1.905 m)   Wt 107 kg (235 lb)   SpO2 96%   BMI 29.37 kg/m²   BP Readings from Last 3 Encounters:   08/29/23 130/68   02/28/23 130/68   11/03/22 162/84     Pulse Readings from Last 3 Encounters:   08/29/23 79   02/28/23 77   11/03/22 80         Well developed, well nourished, well groomed. No dysmorphic features. HEENT:  Normocephalic atraumatic. See neuro exam   Chest:  Respirations appear regular and unlabored. Cardiovascular:  no observed significant swelling. Musculoskeletal:  (see below under neurologic exam for evaluation of motor function and gait)   Skin:  warm and dry, not diaphoretic. Psychiatric:  Normal behavior and appropriate affect       Neurological Examination:     Mental status/cognitive function:   Recent and remote memory intact. Attention span and concentration as well as fund of knowledge are appropriate for age. Normal language and spontaneous speech. Cranial Nerves:  III, IV, VI-Pupils were equal, round. Extraocular movements were full and conjugate   VII-facial expression symmetric  VIII-hearing grossly intact bilaterally   Motor Exam: symmetric bulk throughout. no atrophy, fasciculations or abnormal movements noted.    Coordination:  no apparent dysmetria, ataxia or tremor noted  Gait: steady casual gait  Review of Systems: Constitutional: Negative for appetite change, fatigue and fever. HENT: Negative. Negative for hearing loss, tinnitus, trouble swallowing and voice change. Eyes: Negative. Negative for photophobia, pain and visual disturbance. Respiratory: Negative. Negative for shortness of breath. Cardiovascular: Negative. Negative for palpitations. Gastrointestinal: Negative. Negative for nausea and vomiting. Endocrine: Negative. Negative for cold intolerance. Genitourinary: Negative. Negative for dysuria, frequency and urgency. Musculoskeletal: Negative for back pain, gait problem, myalgias and neck pain. Skin: Negative. Negative for rash. Allergic/Immunologic: Negative. Neurological: Positive for headaches. Negative for dizziness, tremors, seizures, syncope, facial asymmetry, speech difficulty, weakness, light-headedness and numbness. Hematological: Negative. Does not bruise/bleed easily. Psychiatric/Behavioral: Negative. Negative for confusion, hallucinations and sleep disturbance. All other systems reviewed and are negative. I have spent 15 minutes with Patient  today in which greater than 50% of this time was spent in counseling/coordination of care regarding Prognosis, Risks and benefits of tx options, Patient and family education, Importance of tx compliance, Impressions, Documenting in the medical record and Obtaining or reviewing history  . I also spent 10 minutes non face to face for this patient the same day.      Activity Minutes   Precharting/reviewing 5   Patient care/counseling 15   Postcharting/care coordination 5       Author:  Azael Sewell DO 8/29/2023 9:11 AM

## 2023-08-29 NOTE — PROGRESS NOTES
Review of Systems   Constitutional: Negative for appetite change, fatigue and fever. HENT: Negative. Negative for hearing loss, tinnitus, trouble swallowing and voice change. Eyes: Negative. Negative for photophobia, pain and visual disturbance. Respiratory: Negative. Negative for shortness of breath. Cardiovascular: Negative. Negative for palpitations. Gastrointestinal: Negative. Negative for nausea and vomiting. Endocrine: Negative. Negative for cold intolerance. Genitourinary: Negative. Negative for dysuria, frequency and urgency. Musculoskeletal: Negative for back pain, gait problem, myalgias and neck pain. Skin: Negative. Negative for rash. Allergic/Immunologic: Negative. Neurological: Positive for headaches. Negative for dizziness, tremors, seizures, syncope, facial asymmetry, speech difficulty, weakness, light-headedness and numbness. Hematological: Negative. Does not bruise/bleed easily. Psychiatric/Behavioral: Negative. Negative for confusion, hallucinations and sleep disturbance. All other systems reviewed and are negative. Since your last visit are your headaches improved    Any change to the headache type? no    What is your current headache frequency: 1 times per week    Are you taking your current medications as prescribed? yes    Do you have any side effects? no    How may days per week do you take an abortive medicine?  0-1

## 2023-09-04 ENCOUNTER — PATIENT MESSAGE (OUTPATIENT)
Dept: NEUROLOGY | Facility: CLINIC | Age: 63
End: 2023-09-04

## 2023-09-04 DIAGNOSIS — G43.009 MIGRAINE WITHOUT AURA AND WITHOUT STATUS MIGRAINOSUS, NOT INTRACTABLE: Primary | ICD-10-CM

## 2023-09-04 DIAGNOSIS — G47.33 OBSTRUCTIVE SLEEP APNEA (ADULT) (PEDIATRIC): ICD-10-CM

## 2023-09-14 ENCOUNTER — PATIENT MESSAGE (OUTPATIENT)
Dept: NEUROLOGY | Facility: CLINIC | Age: 63
End: 2023-09-14

## 2023-09-15 NOTE — PATIENT COMMUNICATION
September 15, 2023  Alma Rosas, DO  to Neurology [facility] Clinical Team 5 • [facility] [place]      9/15/23  6:06 AM  Is there any way for him to get the labs I ordered done at [address] [place] [address]? Do we need to mail him the scripts?  Thanks

## 2023-09-28 NOTE — PROGRESS NOTES
Caller: PATIENT    Relationship: SELF     Best call back number: [phone]    Requested Prescriptions: HYDROCODONE 10 MG      Pharmacy where request should be sent:  [facility]   [address] [place]     Additional details provided by patient: PATIENT CALLED TO REQUEST A REFILL ON HIS PRESCRIPTION OF HYDROCODONE 10 MG. PATIENT HAS ABOUT A THREE DAY SUPPLY LEFT AND WANTS TO MAKE SURE THIS IS TAKEN CARE OF BEFORE THE WEEKEND SINCE HE WILL BE OUT ON Sunday. PATIENT WOULD LIKE A CALL WHEN THIS HAS BEEN TAKEN CARE OF. THANK YOU!    Does the patient have less than a 3 day supply:  [x] Yes  [] No     Medication Refill Request    Date of phone call: 9/9/2022    Medication being requested: Norco  mg sig: Take 1 tablet by mouth 5 (Five) Times a Day.  Qty: 150    Date of last visit: 7/12/2022    Date of last refill:     CARI up to date?:     Next Follow up?: 9/23/2022    Any new pertinent information? (i.e, new medication allergies, new use of medications, change in patient's health or condition, non-compliance or inconsistency with prescribing agreement?):    Patient scheduled for follow up of procedure on 9/23/22. Will send a partial supply until this appointment. Reviewed UDS and CARI. Both updated and appropriate. Refill appropriate. DNF 9/11/22.      [facility] Consult - Follow up   PATIENT:  April Cogan  MRN:  [id number]  :  1960  DATE OF SERVICE:  2023  REFERRED BY: No ref  provider found  PMD: Leandra Akers DO    Assessment/Plan:   April Cogan is a delightful 58 y o  male with a past medical history that includes diabetes mellitus, HLD and base of tongue cancer s/p chemo and radiation (Base of tongue squamous cell carcinoma status post resection 2017, cisplatin and radiation therapy 2017 and further therapy 2018) here for f/u evaluation of headache  Since his last visit, he reports that he has been doing very well  Currently only experiencing about 3 headache days per month  He is tolerating memantine without any issues and states that Ramu Mcknight works when needed  Discussed the possibility of considering Nurtec in the future if needed, but I would need to take into account his known kidney disease prior to prescribing this  Workup:  -MRI brain w/ and w/o contrast 2022:  No evidence of brain metastasis or acute intracranial abnormality  Unchanged sinonasal posttreatment changes  -PET scan 10/05/2022:  No evidence of neoplastic disease  -MRA head 2022: Normal vessel imaging without any evidence of vascular malformation or aneurysm    -MRA carotids 2022: No evidence of hemodynamically significant stenosis  (I have personally reviewed imaging and radiology read)      Preventative:  - we discussed headache hygiene and lifestyle factors that may improve headaches  - Memantine 10mg twice daily  - Currently on through other providers: None  - Past/ failed/contraindicated: None  - future options: SNRI, CGRP med, botox     Acute:  - discussed not taking over-the-counter or prescription pain medications more than 3 days per week to prevent medication overuse/rebound headache  - Ubrelvy 100mg  - Currently on through other providers: None  - Past/ failed/contraindicated: None  - future options:  Triptans, prochlorperazine, Toradol IM or p o , could consider trial of 5 days of Depakote 500 mg nightly or dexamethasone 2 mg daily for prolonged migraine, reyvow, nurtec (consider kidney disease)  Patient instructions   Headache Calendar  Please maintain a headache calendar  Consider using phone applications such as Migraine Christopher or [employer] Migraine Tracker     Headache/migraine treatment:   Acute medications (for immediate treatment of a headache): It is ok to take ibuprofen, acetaminophen or naproxen (Advil, Tylenol,  Aleve, Excedrin) if they help your headaches you should limit these to No more than 2-3 times a week to avoid medication overuse/rebound headaches       For your more moderate to severe migraines take this medication early   Ubrelvy 100mg tablets      Prescription preventive medications for headaches/migraines   (to take every day to help prevent headaches - not to take at the time of headache):  [x]? Memantine - 10mg twice daily     Lifestyle Recommendations:  [x]? SLEEP - Maintain a regular sleep schedule: Adults need at least 7-8 hours of uninterrupted a night  Maintain good sleep hygiene:  Going to bed and waking up at consistent times, avoiding excessive daytime naps, avoiding caffeinated beverages in the evening, avoid excessive stimulation in the evening and generally using bed primarily for sleeping  One hour before bedtime would recommend turning lights down lower, decreasing your activity (may read quietly, listen to music at a low volume)  When you get into bed, should eliminate all technology (no texting, emailing, playing with your phone, iPad or tablet in bed)  [x]? HYDRATION - Maintain good hydration  Drink  2L of fluid a day (4 typical small water bottles)  [x]? DIET - Maintain good nutrition  In particular don't skip meals and try and eat healthy balanced meals regularly  [x]?  TRIGGERS - Look for other triggers and avoid them: Limit caffeine to 1-2 cups a day or less  Avoid dietary triggers that you have noticed bring on your headaches (this could include aged cheese, peanuts, MSG, aspartame and nitrates)  [x]? EXERCISE - physical exercise as we all know is good for you in many ways, and not only is good for your heart, but also is beneficial for your mental health, cognitive health and  chronic pain/headaches  I would encourage at the least 5 days of physical exercise weekly for at least 30 minutes       Education and Follow-up  [x]? Please call with any questions or concerns  Of course if any new concerning symptoms go to the emergency department  [x]? Follow up in 6 months  Subjective:   2/28/23: Since his last visit, he reports that his headaches have improved  Currently experiencing about 3 headache days per month  Tolerating Memantine without any issues  Gabe Treadwell works when needed - has not used it much  Previous History:  11/3/22:  Mr Elena Hicks reports a history of headaches that started about 5-6 months ago  He denies any prior history of headaches but reports that since that time he has had a nearly daily headache with significant flare-ups  He reported a significant positional component to the headaches and noted worsening with coughing, sneezing, bending over, or bearing down  He underwent an MRI of the brain with and without contrast at [facility] which did not show any evidence of metastatic disease or primary brain tumor  He also underwent a PET scan that did not show any evidence of neoplastic disease  Certainly the multiple red flags and positional component the headaches are concerning but it is reassuring that he had a normal MRI and normal dilated eye exam   However, I would like to obtain further imaging to evaluate for any vascular lesions  The description of his headaches does have a migrainous quality to them and for that reason I would like to treat him    From an abortive standpoint I will have him try Ubrelvy  I have asked him to reach out to his cardiologist to discuss the use of triptans as the previously mentioned a high calcium score and was being followed for suspected cardiac risks  From a preventive standpoint, I will have him try memantine especially since he reports issues with his memory and is concerned for dementia due to a strong family history of dementia on his mother's side  Ideally I would like him to take 10 mg in the morning and 10 mg in the evening but due to kidney disease since his chemotherapy I will have him discuss an elevated dose with his nephrologist on Monday, otherwise we will attempt to keep him on 5 mg b i d  I have asked him to keep me updated throughout the process and let me know if there are any issues or concerns  Past Medical History:     Past Medical History:   Diagnosis Date   • [facility]    • Diabetes mellitus ([facility] )    • Kidney disease    • Sleep apnea 12/2017       There is no problem list on file for this patient  Medications:      Current Outpatient Medications   Medication Sig Dispense Refill   • aspirin (ECOTRIN LOW STRENGTH) 81 mg EC tablet      • Cholecalciferol (Vitamin D3) 50 MCG (2000 UT) capsule      • Coenzyme Q10 (CoQ10) 200 MG CAPS      • Ferrous Bisglycinate Chelate 28 MG CAPS      • Januvia 100 MG tablet Take 100 mg by mouth daily     • Jublia 10 % SOLN APPLY TO AFFECTED TOENAIL(S) BY TOPICAL ROUTE ONCE DAILY     • lisinopril (ZESTRIL) 10 mg tablet Take 10 mg by mouth daily     • memantine (NAMENDA) 10 mg tablet Take 1 tablet (10 mg total) by mouth 2 (two) times a day 60 tablet 5   • metFORMIN (GLUCOPHAGE-XR) 500 mg 24 hr tablet 2 (two) times a day     • rosuvastatin (CRESTOR) 40 MG tablet Take 40 mg by mouth daily     • SODIUM FLUORIDE, DENTAL GEL, 1 1 % GEL      • tamsulosin (FLOMAX) 0 4 mg Take 0 4 mg by mouth daily     • Ubrogepant (UBRELVY) 100 MG tablet Take 1 tablet (100 mg) one time as needed for migraine   May repeat one additional tablet (100 mg) at least two hours after the first dose  Do not use more than two doses per day, or for more than eight days per month  8 tablet 3   • vitamin B-12 (CYANOCOBALAMIN) 2500 MCG TABS        No current facility-administered medications for this visit  Allergies:       Allergies   Allergen Reactions   • Mixed Ragweed Allergic Rhinitis and Drowsiness       Family History:     Family History   Problem Relation Age of Onset   • Dementia Mother    • Stroke Mother    • Multiple myeloma Father    • Dementia Maternal Grandmother        Social History:     Social History     Socioeconomic History   • Marital status: /Civil Union     Spouse name: Not on file   • Number of children: Not on file   • Years of education: Not on file   • Highest education level: Not on file   Occupational History   • Not on file   Tobacco Use   • Smoking status: Never   • Smokeless tobacco: Never   Vaping Use   • Vaping Use: Never used   Substance and Sexual Activity   • Alcohol use: Not Currently     Comment: One or two drinks a month   • Drug use: Never   • Sexual activity: Yes     Partners: Female     Birth control/protection: Male Sterilization   Other Topics Concern   • Not on file   Social History Narrative   • Not on file     Social Determinants of Health     Financial Resource Strain: Not on file   Food Insecurity: Not on file   Transportation Needs: Not on file   Physical Activity: Not on file   Stress: Not on file   Social Connections: Not on file   Intimate Partner Violence: Not on file   Housing Stability: Not on file     Objective:   Physical Exam:                                                               Vitals:            Constitutional:    /68 (BP Location: Left arm, Patient Position: Sitting, Cuff Size: Large)   Pulse 77   Temp 97 9 °F (36 6 °C) (Temporal)   Ht 6' 3" (1 905 m)   Wt 114 kg (251 lb 14 4 oz)   SpO2 96%   BMI 31 49 kg/m²   BP Readings from Last 3 Encounters: 02/28/23 130/68   11/03/22 162/84   09/26/20 130/78     Pulse Readings from Last 3 Encounters:   02/28/23 77   11/03/22 80   09/26/20 80         Well developed, well nourished, well groomed  No dysmorphic features  HEENT:  Normocephalic atraumatic  See neuro exam   Chest:  Respirations appear regular and unlabored  Cardiovascular:  no observed significant swelling  Musculoskeletal:  (see below under neurologic exam for evaluation of motor function and gait)   Skin:  warm and dry, not diaphoretic  Psychiatric:  Normal behavior and appropriate affect       Neurological Examination:     Mental status/cognitive function:   Recent and remote memory intact  Attention span and concentration as well as fund of knowledge are appropriate for age  Normal language and spontaneous speech  Cranial Nerves:  III, IV, VI-Pupils were equal, round  Extraocular movements were full and conjugate   VII-facial expression symmetric  VIII-hearing grossly intact bilaterally   Motor Exam: symmetric bulk throughout  no atrophy, fasciculations or abnormal movements noted  Coordination:  no apparent dysmetria, ataxia or tremor noted  Gait: steady casual gait  Review of Systems:   Constitutional: Negative  Negative for appetite change and fever  HENT: Negative  Negative for hearing loss, tinnitus, trouble swallowing and voice change  Eyes: Negative  Negative for photophobia, pain and visual disturbance  Respiratory: Negative  Negative for shortness of breath  Cardiovascular: Negative  Negative for palpitations  Gastrointestinal: Negative  Negative for nausea and vomiting  Endocrine: Negative  Negative for cold intolerance  Genitourinary: Negative  Negative for dysuria, frequency and urgency  Musculoskeletal: Negative  Negative for gait problem, myalgias and neck pain  Skin: Negative  Negative for rash  Allergic/Immunologic: Negative  Neurological: Negative    Negative for dizziness, tremors, seizures, syncope, facial asymmetry, speech difficulty, weakness, light-headedness, numbness and headaches  Hematological: Negative  Does not bruise/bleed easily  Psychiatric/Behavioral: Negative  Negative for confusion, hallucinations and sleep disturbance  All other systems reviewed and are negative  I have spent 15 minutes with Patient  today in which greater than 50% of this time was spent in counseling/coordination of care regarding Diagnostic results, Prognosis, Risks and benefits of tx options, Patient and family education, Importance of tx compliance, Impressions and Documenting in the medical record  I also spent 15 minutes non face to face for this patient the same day       Activity Minutes   Precharting/reviewing 10   Patient care/counseling 15   Postcharting/care coordination 5       Author:  Chase Atkins DO 2/28/2023 8:50 AM No

## 2023-10-10 LAB
FOLATE SERPL-MCNC: 11.8 NG/ML
VIT B12 SERPL-MCNC: 1960 PG/ML (ref 232–1245)

## 2024-04-30 ENCOUNTER — OFFICE VISIT (OUTPATIENT)
Dept: NEUROLOGY | Facility: CLINIC | Age: 64
End: 2024-04-30
Payer: COMMERCIAL

## 2024-04-30 VITALS
HEART RATE: 73 BPM | WEIGHT: 234.2 LBS | HEIGHT: 75 IN | OXYGEN SATURATION: 99 % | DIASTOLIC BLOOD PRESSURE: 80 MMHG | BODY MASS INDEX: 29.12 KG/M2 | SYSTOLIC BLOOD PRESSURE: 140 MMHG | RESPIRATION RATE: 18 BRPM | TEMPERATURE: 98 F

## 2024-04-30 DIAGNOSIS — G43.009 MIGRAINE WITHOUT AURA AND WITHOUT STATUS MIGRAINOSUS, NOT INTRACTABLE: Primary | ICD-10-CM

## 2024-04-30 DIAGNOSIS — G47.33 OBSTRUCTIVE SLEEP APNEA (ADULT) (PEDIATRIC): ICD-10-CM

## 2024-04-30 PROCEDURE — 99214 OFFICE O/P EST MOD 30 MIN: CPT | Performed by: STUDENT IN AN ORGANIZED HEALTH CARE EDUCATION/TRAINING PROGRAM

## 2024-04-30 NOTE — PROGRESS NOTES
Since your last visit are your headaches Improved    Any change to the headache type? NO    What is your current headache frequency: 1 times per month    Are you taking your current medications as prescribed? yes        Do you have any side effects? no    How may days per week do you take an abortive medicine? 0/7

## 2024-04-30 NOTE — PATIENT INSTRUCTIONS
Headache Calendar  Please maintain a headache calendar  Consider using phone applications such as Migraine Christopher or [employer] Migraine Tracker     Headache/migraine treatment:   Acute medications (for immediate treatment of a headache):   It is ok to take ibuprofen, acetaminophen or naproxen (Advil, Tylenol,  Aleve, Excedrin) if they help your headaches you should limit these to No more than 2-3 times a week to avoid medication overuse/rebound headaches.      For your more moderate to severe migraines take this medication early   Ubrelvy 100mg tablets      Prescription preventive medications for headaches/migraines   Memantine  Week 1: 5mg in the morning and 10mg in the evening  Week 2: 5mg twice daily  Week 3: 5mg in the evening  Week 4: 3 options - stop altogether, every other night for 1 week and then stop, extend out 1 more week of nightly tablet and then stop     Over the counter preventive supplements for headaches/migraines (if you try, try for 3 months straight)  (to take every day to help prevent headaches - not to take at the time of headache):  [x] Magnesium 400mg daily (If any diarrhea or upset stomach, decrease dose  as tolerated) - oxide or glycinate  [x] Riboflavin (Vitamin B2) 400mg daily - (FYI B2 may make your urine bright/neon yellow)    Lifestyle Recommendations:  [x] SLEEP - Maintain a regular sleep schedule: Adults need at least 7-8 hours of uninterrupted a night. Maintain good sleep hygiene:  Going to bed and waking up at consistent times, avoiding excessive daytime naps, avoiding caffeinated beverages in the evening, avoid excessive stimulation in the evening and generally using bed primarily for sleeping.  One hour before bedtime would recommend turning lights down lower, decreasing your activity (may read quietly, listen to music at a low volume). When you get into bed, should eliminate all technology (no texting, emailing, playing with your phone, iPad or tablet in bed).  [x] HYDRATION -  Maintain good hydration.  Drink  2L of fluid a day (4 typical small water bottles)  [x] DIET - Maintain good nutrition. In particular don't skip meals and try and eat healthy balanced meals regularly.  [x] TRIGGERS - Look for other triggers and avoid them: Limit caffeine to 1-2 cups a day or less. Avoid dietary triggers that you have noticed bring on your headaches (this could include aged cheese, peanuts, MSG, aspartame and nitrates).  [x] EXERCISE - physical exercise as we all know is good for you in many ways, and not only is good for your heart, but also is beneficial for your mental health, cognitive health and  chronic pain/headaches. I would encourage at the least 5 days of physical exercise weekly for at least 30 minutes.      Education and Follow-up  [x] Please call with any questions or concerns. Of course if any new concerning symptoms go to the emergency department.  [x] Follow up in 8 months

## 2024-04-30 NOTE — PROGRESS NOTES
Review of Systems   Constitutional:  Negative for appetite change, fatigue and fever.   HENT: Negative.  Negative for hearing loss, tinnitus, trouble swallowing and voice change.    Eyes: Negative.  Negative for photophobia, pain and visual disturbance.   Respiratory: Negative.  Negative for shortness of breath.    Cardiovascular: Negative.  Negative for palpitations.   Gastrointestinal: Negative.  Negative for nausea and vomiting.   Endocrine: Negative.  Negative for cold intolerance.   Genitourinary: Negative.  Negative for dysuria, frequency and urgency.   Musculoskeletal:  Negative for back pain, gait problem, myalgias, neck pain and neck stiffness.   Skin: Negative.  Negative for rash.   Allergic/Immunologic: Negative.    Neurological:  Positive for headaches (IMPROVED). Negative for dizziness, tremors, seizures, syncope, facial asymmetry, speech difficulty, weakness, light-headedness and numbness.   Hematological: Negative.  Does not bruise/bleed easily.   Psychiatric/Behavioral: Negative.  Negative for confusion, hallucinations and sleep disturbance.    All other systems reviewed and are negative.

## 2024-04-30 NOTE — PROGRESS NOTES
[facility] Consult - Follow up   PATIENT:  Yan Gorman  MRN:  [id number]  :  1960  DATE OF SERVICE:  2024  REFERRED BY: No ref. provider found  PMD: Dion Bertrand DO    Assessment/Plan:   Yan Gorman is a delightful 62 y.o. 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     At today's visit, he reports that he is doing exceptionally well.  He currently endorses about 1 headache day per month and at times will have no headaches.  He was interested in tapering off of memantine so I have given him a schedule over the next 4 weeks or so.  From an abortive standpoint, I will have him continue with Ubrelvy.  Should his headaches return after he comes off of memantine, it would be worthwhile considering either magnesium and B2 supplements or a low dosage of memantine.    Workup:   -MRI brain w/ and w/o contrast 2022: No evidence of brain metastasis or acute intracranial abnormality. Unchanged sinonasal posttreatment changes.   -PET scan 10/05/2022: No evidence of neoplastic disease   -MRA head 2022: Normal vessel imaging without any evidence of vascular malformation or aneurysm.   -MRA carotids 2022: No evidence of hemodynamically significant stenosis      Preventative:   - we discussed headache hygiene and lifestyle factors that may improve headaches   - Taper off memantine  - Consider magnesium and B2 supplements in the future  - Currently on through other providers: Lisinopril  - Past/ failed/contraindicated: Avoid Topamax due to history of kidney stones  - future options: SNRI, CGRP med, Botox      Acute:   - discussed not taking over-the-counter or prescription pain  medications more than 3 days per week to prevent medication overuse/rebound headache   - Ubrelvy 100mg   - Currently on through other providers: None   - Past/ failed/contraindicated: None   - future options: Triptans, prochlorperazine, Toradol IM or p.o., could consider trial of 5 days of Depakote 500 mg nightly or dexamethasone 2 mg daily for prolonged migraine, reyvow, nurtec (consider kidney disease), Zavzpret  Patient instructions   Headache Calendar  Please maintain a headache calendar  Consider using phone applications such as Migraine Christopher or [facility] Migraine Tracker     Headache/migraine treatment:   Acute medications (for immediate treatment of a headache):   It is ok to take ibuprofen, acetaminophen or naproxen (Advil, Tylenol,  Aleve, Excedrin) if they help your headaches you should limit these to No more than 2-3 times a week to avoid medication overuse/rebound headaches.      For your more moderate to severe migraines take this medication early   Ubrelvy 100mg tablets      Prescription preventive medications for headaches/migraines   Memantine  Week 1: 5mg in the morning and 10mg in the evening  Week 2: 5mg twice daily  Week 3: 5mg in the evening  Week 4: 3 options - stop altogether, every other night for 1 week and then stop, extend out 1 more week of nightly tablet and then stop     Over the counter preventive supplements for headaches/migraines (if you try, try for 3 months straight)  (to take every day to help prevent headaches - not to take at the time of headache):  [x] Magnesium 400mg daily (If any diarrhea or upset stomach, decrease dose  as tolerated) - oxide or glycinate  [x] Riboflavin (Vitamin B2) 400mg daily - (FYI B2 may make your urine bright/neon yellow)    Lifestyle Recommendations:  [x] SLEEP - Maintain a regular sleep schedule: Adults need at least 7-8 hours of uninterrupted a night. Maintain good sleep hygiene:  Going to bed and waking up at consistent times, avoiding excessive  daytime naps, avoiding caffeinated beverages in the evening, avoid excessive stimulation in the evening and generally using bed primarily for sleeping.  One hour before bedtime would recommend turning lights down lower, decreasing your activity (may read quietly, listen to music at a low volume). When you get into bed, should eliminate all technology (no texting, emailing, playing with your phone, iPad or tablet in bed).  [x] HYDRATION - Maintain good hydration.  Drink  2L of fluid a day (4 typical small water bottles)  [x] DIET - Maintain good nutrition. In particular don't skip meals and try and eat healthy balanced meals regularly.  [x] TRIGGERS - Look for other triggers and avoid them: Limit caffeine to 1-2 cups a day or less. Avoid dietary triggers that you have noticed bring on your headaches (this could include aged cheese, peanuts, MSG, aspartame and nitrates).  [x] EXERCISE - physical exercise as we all know is good for you in many ways, and not only is good for your heart, but also is beneficial for your mental health, cognitive health and  chronic pain/headaches. I would encourage at the least 5 days of physical exercise weekly for at least 30 minutes.      Education and Follow-up  [x] Please call with any questions or concerns. Of course if any new concerning symptoms go to the emergency department.  [x] Follow up in 8 months  Subjective:   4/30/24: At today's visit, he reports overall improvement in terms of his headaches.  He currently endorses about 1 headache day per month.  He continues to take memantine 10 mg twice daily without any significant issues.  From an abortive standpoint, Ubrelvy tends to work well. He endorses a bit of daytime fatigue with Memantine, but is otherwise doing well.     Previous History:  8/29/23: Since his last visit he reports improvement in terms of his headaches. Currently experiencing about 4-5 headache days per month of which 1 per week may be more severe. Continuing  to take Memantine daily without any issues. From an abortive standpoint, Ubrelvy tends to work well. With regards to sleep he gets about 5-7 hours per night. Using his CPAP nightly and on review of his AHI it appears that his machine is working well. In terms of memory, he reports issues every once in a while, but overall stable since our prior visit.  2/28/23: Since his last visit, he reports that his headaches have improved.  Currently experiencing about 3 headache days per month. Tolerating Memantine without any issues. Ubrelvy works when needed - has not used it much.  11/3/22:  Mr. Gorman reports a history of headaches that started about 5-6 months ago.  He denies any prior history of headaches but reports that since that time he has had a nearly daily headache with significant flare-ups.  He reported a significant positional component to the headaches and noted worsening with coughing, sneezing, bending over, or bearing down.  He underwent an MRI of the brain with and without contrast at [facility] which did not show any evidence of metastatic disease or primary brain tumor.  He also underwent a PET scan that did not show any evidence of neoplastic disease.  Certainly the multiple red flags and positional component the headaches are concerning but it is reassuring that he had a normal MRI and normal dilated eye exam.  However, I would like to obtain further imaging to evaluate for any vascular lesions.  The description of his headaches does have a migrainous quality to them and for that reason I would like to treat him.  From an abortive standpoint I will have him try Ubrelvy. I have asked him to reach out to his cardiologist to discuss the use of triptans as the previously mentioned a high calcium score and was being followed for suspected cardiac risks.  From a preventive standpoint, I will have him try memantine especially since he reports issues with his memory and is concerned for dementia due to a  strong family history of dementia on his mother's side.  Ideally I would like him to take 10 mg in the morning and 10 mg in the evening but due to kidney disease since his chemotherapy I will have him discuss an elevated dose with his nephrologist on Monday, otherwise we will attempt to keep him on 5 mg b.i.d..  I have asked him to keep me updated throughout the process and let me know if there are any issues or concerns.  Past Medical History:     Past Medical History:   Diagnosis Date    Cancer (HCC)     Diabetes mellitus (HCC)     Kidney disease     Migraine April 2022    Sleep apnea 12/2017       There is no problem list on file for this patient.      Medications:      Current Outpatient Medications   Medication Sig Dispense Refill    aspirin (ECOTRIN LOW STRENGTH) 81 mg EC tablet       Cholecalciferol (Vitamin D3) 50 MCG (2000 UT) capsule       Coenzyme Q10 (CoQ10) 200 MG CAPS       Ferrous Bisglycinate Chelate 28 MG CAPS       Januvia 100 MG tablet Take 100 mg by mouth daily      lisinopril (ZESTRIL) 10 mg tablet Take 10 mg by mouth daily      memantine (NAMENDA) 10 mg tablet Take 1 tablet (10 mg total) by mouth 2 (two) times a day 180 tablet 3    metFORMIN (GLUCOPHAGE-XR) 500 mg 24 hr tablet 2 (two) times a day      rosuvastatin (CRESTOR) 40 MG tablet Take 40 mg by mouth daily      SODIUM FLUORIDE, DENTAL GEL, 1.1 % GEL       tamsulosin (FLOMAX) 0.4 mg Take 0.4 mg by mouth daily      Ubrogepant (UBRELVY) 100 MG tablet Take 1 tablet (100 mg) one time as needed for migraine. May repeat one additional tablet (100 mg) at least two hours after the first dose. Do not use more than two doses per day, or for more than eight days per month. 16 tablet 6    vitamin B-12 (CYANOCOBALAMIN) 2500 MCG TABS       fexofenadine (ALLEGRA) 180 MG tablet Take 180 mg by mouth daily (Patient not taking: Reported on 4/30/2024)      Jublia 10 % SOLN APPLY TO AFFECTED TOENAIL(S) BY TOPICAL ROUTE ONCE DAILY (Patient not taking: Reported  "on 4/30/2024)       No current facility-administered medications for this visit.        Allergies:      Allergies   Allergen Reactions    Mixed Ragweed Allergic Rhinitis and Drowsiness       Family History:     Family History   Problem Relation Age of Onset    Dementia Mother     Stroke Mother     Multiple myeloma Father     Dementia Maternal Grandmother        Social History:     Social History     Socioeconomic History    Marital status: /Civil Union     Spouse name: Not on file    Number of children: Not on file    Years of education: Not on file    Highest education level: Not on file   Occupational History    Not on file   Tobacco Use    Smoking status: Never    Smokeless tobacco: Never   Vaping Use    Vaping status: Never Used   Substance and Sexual Activity    Alcohol use: Not Currently     Comment: One or two drinks a month    Drug use: Never    Sexual activity: Yes     Partners: Female     Birth control/protection: Male Sterilization   Other Topics Concern    Not on file   Social History Narrative    Not on file     Social Determinants of Health     Financial Resource Strain: Not on file   Food Insecurity: Not on file   Transportation Needs: Not on file   Physical Activity: Not on file   Stress: Not on file   Social Connections: Not on file   Intimate Partner Violence: Not on file   Housing Stability: Not on file         Objective:   Physical Exam:                                                               Vitals:            Constitutional:  /80 (BP Location: Left arm, Patient Position: Sitting, Cuff Size: Standard)   Pulse 73   Temp 98 °F (36.7 °C) (Temporal)   Resp 18   Ht 6' 3\" (1.905 m)   Wt 106 kg (234 lb 3.2 oz)   SpO2 99%   BMI 29.27 kg/m²   BP Readings from Last 3 Encounters:   04/30/24 140/80   08/29/23 130/68   02/28/23 130/68     Pulse Readings from Last 3 Encounters:   04/30/24 73   08/29/23 79   02/28/23 77         Well developed, well nourished, well groomed. No " dysmorphic features.       HEENT:  Normocephalic atraumatic. See neuro exam   Chest:  Respirations appear regular and unlabored.    Cardiovascular:  no observed significant swelling.    Musculoskeletal:  (see below under neurologic exam for evaluation of motor function and gait)   Skin:  warm and dry, not diaphoretic.    Psychiatric:  Normal behavior and appropriate affect       Neurological Examination:     Mental status/cognitive function:   Recent and remote memory intact. Attention span and concentration as well as fund of knowledge are appropriate for age. Normal language and spontaneous speech.  Cranial Nerves:  III, IV, VI-Pupils were equal, round. Extraocular movements were full and conjugate   VII-facial expression symmetric  VIII-hearing grossly intact bilaterally   Motor Exam: symmetric bulk throughout. no atrophy, fasciculations or abnormal movements noted.   Coordination:  no apparent dysmetria, ataxia or tremor noted  Gait: steady casual gait  Review of Systems:   Constitutional:  Negative for appetite change, fatigue and fever.   HENT: Negative.  Negative for hearing loss, tinnitus, trouble swallowing and voice change.    Eyes: Negative.  Negative for photophobia, pain and visual disturbance.   Respiratory: Negative.  Negative for shortness of breath.    Cardiovascular: Negative.  Negative for palpitations.   Gastrointestinal: Negative.  Negative for nausea and vomiting.   Endocrine: Negative.  Negative for cold intolerance.   Genitourinary: Negative.  Negative for dysuria, frequency and urgency.   Musculoskeletal:  Negative for back pain, gait problem, myalgias, neck pain and neck stiffness.   Skin: Negative.  Negative for rash.   Allergic/Immunologic: Negative.    Neurological:  Positive for headaches (IMPROVED). Negative for dizziness, tremors, seizures, syncope, facial asymmetry, speech difficulty, weakness, light-headedness and numbness.   Hematological: Negative.  Does not bruise/bleed easily.    Psychiatric/Behavioral: Negative.  Negative for confusion, hallucinations and sleep disturbance.    All other systems reviewed and are negative.    I have spent 20 minutes with Patient  today in which greater than 50% of this time was spent in counseling/coordination of care regarding Prognosis, Risks and benefits of tx options, Patient and family education, Impressions, Documenting in the medical record, Reviewing / ordering tests, medicine, procedures  , and Obtaining or reviewing history  . I also spent 10 minutes non face to face for this patient the same day.     Activity Minutes   Precharting/reviewing 5   Patient care/counseling 20   Postcharting/care coordination 5       Author:  Shiva Poole DO 4/30/2024 9:08 AM

## 2024-08-14 DIAGNOSIS — G43.009 MIGRAINE WITHOUT AURA AND WITHOUT STATUS MIGRAINOSUS, NOT INTRACTABLE: ICD-10-CM

## 2024-08-14 RX ORDER — MEMANTINE HYDROCHLORIDE 10 MG/1
10 TABLET ORAL 2 TIMES DAILY
Qty: 180 TABLET | Refills: 1 | Status: SHIPPED | OUTPATIENT
Start: 2024-08-14

## 2024-12-30 ENCOUNTER — OFFICE VISIT (OUTPATIENT)
Dept: NEUROLOGY | Facility: CLINIC | Age: 64
End: 2024-12-30
Payer: COMMERCIAL

## 2024-12-30 VITALS
OXYGEN SATURATION: 95 % | WEIGHT: 234 LBS | TEMPERATURE: 98.3 F | BODY MASS INDEX: 29.25 KG/M2 | HEART RATE: 80 BPM | SYSTOLIC BLOOD PRESSURE: 118 MMHG | DIASTOLIC BLOOD PRESSURE: 58 MMHG

## 2024-12-30 DIAGNOSIS — G43.009 MIGRAINE WITHOUT AURA AND WITHOUT STATUS MIGRAINOSUS, NOT INTRACTABLE: Primary | ICD-10-CM

## 2024-12-30 PROCEDURE — 99213 OFFICE O/P EST LOW 20 MIN: CPT

## 2024-12-30 RX ORDER — MAGNESIUM GLYCINATE 100 MG
400 CAPSULE ORAL
COMMUNITY

## 2024-12-30 RX ORDER — EMPAGLIFLOZIN, METFORMIN HYDROCHLORIDE 10; 1000 MG/1; MG/1
TABLET, EXTENDED RELEASE ORAL
COMMUNITY
Start: 2024-12-16

## 2024-12-30 RX ORDER — LANCETS 30 GAUGE
EACH MISCELLANEOUS
COMMUNITY
Start: 2024-11-20

## 2024-12-30 NOTE — ASSESSMENT & PLAN NOTE
I had the pleasure of seeing Onur today in the office at [facility] in [place].  He is presenting today as an office visit follow-up appointment for his migraine headaches.  Patient was previously seeing Dr. Poole and was last seen in the office by Dr. Poole on 04/30/2024.  Since his last visit he reports that he has approximately 6 to 10 days out of the month with some type of headache.  However, he is currently tracking his headaches and noting that he is having more mild to moderate headaches.  He has only had approximately 1 severe headache that occurred this month.  He notes that he is currently taking magnesium and vitamin B2 for migraine supplementation and currently no longer taking Namenda.  He is taking Ubrelvy as needed for abortive therapy of the headaches.  He notes that the medication seems to be working in about an hour but takes time to eliminate the headache fully.  Not usually having to take medications for much milder headaches but will use Tylenol to start for severe headaches before he ends up taking Ubrelvy.  Patient has no other issues or concerns.  He would like to stay on the same regimen of his medications at this time.  Continue Ubrelvy 100 mg for abortive therapy.  Can continue magnesium and B2 supplementation as well.  Patient will follow-up with Dr. Poole in about 6 months time in case he notices more headaches with coming off Namenda.

## 2024-12-30 NOTE — PROGRESS NOTES
Name: Yan Gorman      : 1960      MRN: [id number]  Encounter Provider: Carson Ray PA-C  Encounter Date: 2024   Encounter department: [facility]  :  Assessment & Plan  Migraine without aura and without status migrainosus, not intractable  I had the pleasure of seeing Onur today in the office at [facility] in [place].  He is presenting today as an office visit follow-up appointment for his migraine headaches.  Patient was previously seeing Dr. Poole and was last seen in the office by Dr. Poole on 2024.  Since his last visit he reports that he has approximately 6 to 10 days out of the month with some type of headache.  However, he is currently tracking his headaches and noting that he is having more mild to moderate headaches.  He has only had approximately 1 severe headache that occurred this month.  He notes that he is currently taking magnesium and vitamin B2 for migraine supplementation and currently no longer taking Namenda.  He is taking Ubrelvy as needed for abortive therapy of the headaches.  He notes that the medication seems to be working in about an hour but takes time to eliminate the headache fully.  Not usually having to take medications for much milder headaches but will use Tylenol to start for severe headaches before he ends up taking Ubrelvy.  Patient has no other issues or concerns.  He would like to stay on the same regimen of his medications at this time.  Continue Ubrelvy 100 mg for abortive therapy.  Can continue magnesium and B2 supplementation as well.  Patient will follow-up with Dr. Poole in about 6 months time in case he notices more headaches with coming off Namenda.           Patient Instructions   Headache Calendar  Please maintain a headache calendar  Consider using phone applications such as Migraine Buddy or [place] Migraine Tracker     Headache/migraine treatment:   Acute medications  (for immediate treatment of a headache):   It is ok to take ibuprofen, acetaminophen or naproxen (Advil, Tylenol,  Aleve, Excedrin) if they help your headaches you should limit these to No more than 2-3 times a week to avoid medication overuse/rebound headaches.      For your more moderate to severe migraines take this medication early   - Continue Ubrelvy 100mg tablets    Over the counter preventive supplements for headaches/migraines (if you try, try for 3 months straight)  (to take every day to help prevent headaches - not to take at the time of headache):  [x] Magnesium 400mg daily (If any diarrhea or upset stomach, decrease dose  as tolerated) - oxide or glycinate  [x] Riboflavin (Vitamin B2) 400mg daily - (FYI B2 may make your urine bright/neon yellow)    Lifestyle Recommendations:  [x] SLEEP - Maintain a regular sleep schedule: Adults need at least 7-8 hours of uninterrupted a night. Maintain good sleep hygiene:  Going to bed and waking up at consistent times, avoiding excessive daytime naps, avoiding caffeinated beverages in the evening, avoid excessive stimulation in the evening and generally using bed primarily for sleeping.  One hour before bedtime would recommend turning lights down lower, decreasing your activity (may read quietly, listen to music at a low volume). When you get into bed, should eliminate all technology (no texting, emailing, playing with your phone, iPad or tablet in bed).  [x] HYDRATION - Maintain good hydration.  Drink  2L of fluid a day (4 typical small water bottles)  [x] DIET - Maintain good nutrition. In particular don't skip meals and try and eat healthy balanced meals regularly.  [x] TRIGGERS - Look for other triggers and avoid them: Limit caffeine to 1-2 cups a day or less. Avoid dietary triggers that you have noticed bring on your headaches (this could include aged cheese, peanuts, MSG, aspartame and nitrates).  [x] EXERCISE - physical exercise as we all know is good for you  in many ways, and not only is good for your heart, but also is beneficial for your mental health, cognitive health and  chronic pain/headaches. I would encourage at the least 5 days of physical exercise weekly for at least 30 minutes.      Education and Follow-up  [x] Please call with any questions or concerns. Of course if any new concerning symptoms go to the emergency department.  [x] Follow up in 6 months with Dr. Poole     History of Present Illness   HPI      For Review:    The patient was last seen in the office on 04/30/2024 by Dr. Poole.  At the time of the last visit the patient was endorsing about 1 headache day per month and at times not having any headaches.  Was noted that the patient was interested in tapering off the memantine he was taking for preventative therapy.  Dr. Poole had provided the patient with instructions on how to taper memantine.  He was to still continue with Ubrelvy 100 mg for abortive therapy.  And he could also consider starting magnesium and B2 supplementation in regards to additional migraine preventative therapy.      Current medical illnesses: diabetes mellitus, HLD and base of tongue cancer s/p chemo and radiation (Base of tongue squamous cell carcinoma status post resection July 2017, cisplatin and radiation therapy November 2017 and further therapy November 2018)       What medications do you take or have you taken for your headaches?   Current Preventive:   Mg and B2, lisinopril    Current Abortive:   Ubrelvy, Tylenol      Prior Preventive:   Memantine, avoid Topamax due to kidney stone    Prior Abortive:   None    Interval updates as of 12/30/2024:    Does have approximately 6-10 days out of the month with some type of headache. More mild to moderate headaches when tracking them now.  Only had 1 severe headache in the month of December. Currently taking magnesium and B2 supplementation and currently off the namenda. Since tapering off on Namenda, noticed more frequent  headaches but not as severe.  Ubrelvy as needed for abortive therapy. Seems to start working in an hour after taking but takes sometime to eliminate the headache fully. Not usually having to take medication for much more milder headaches he states. Started with Tylenol for more severe headache and then ended up taking Ubrelvy.     Review of Systems   Constitutional:  Negative for appetite change, fatigue and fever.   HENT: Negative.  Negative for hearing loss, tinnitus, trouble swallowing and voice change.    Eyes: Negative.  Negative for photophobia, pain and visual disturbance.   Respiratory: Negative.  Negative for shortness of breath.    Cardiovascular: Negative.  Negative for palpitations.   Gastrointestinal: Negative.  Negative for nausea and vomiting.   Endocrine: Negative.  Negative for cold intolerance.   Genitourinary: Negative.  Negative for dysuria, frequency and urgency.   Musculoskeletal:  Negative for back pain, gait problem, myalgias, neck pain and neck stiffness.   Skin: Negative.  Negative for rash.   Allergic/Immunologic: Negative.    Neurological:  Positive for headaches (10/30 days, may last all day). Negative for dizziness, tremors, seizures, syncope, facial asymmetry, speech difficulty, weakness, light-headedness and numbness.   Hematological: Negative.  Does not bruise/bleed easily.   Psychiatric/Behavioral: Negative.  Negative for confusion, hallucinations and sleep disturbance.    All other systems reviewed and are negative.   I have personally reviewed the MA's review of systems and made changes as necessary.    Medical History Reviewed by provider this encounter:  Tobacco  Allergies  Meds  Problems  Med Hx  Surg Hx  Fam Hx     .  Past Medical History   Past Medical History:   Diagnosis Date    Cancer (HCC)     Diabetes mellitus (HCC)     Kidney disease     Migraine April 2022    Sleep apnea 12/2017     Past Surgical History:   Procedure Laterality Date    TRANSURETHRAL RESECTION OF  PROSTATE  02/22/2024     Family History   Problem Relation Age of Onset    Dementia Mother     Stroke Mother     Multiple myeloma Father     Dementia Maternal Grandmother       reports that he has never smoked. He has never used smokeless tobacco. He reports that he does not currently use alcohol. He reports that he does not use drugs.  Current Outpatient Medications on File Prior to Visit   Medication Sig Dispense Refill    aspirin (ECOTRIN LOW STRENGTH) 81 mg EC tablet       Cholecalciferol (Vitamin D3) 50 MCG (2000 UT) capsule       Coenzyme Q10 (CoQ10) 200 MG CAPS       Ferrous Bisglycinate Chelate 28 MG CAPS       Januvia 100 MG tablet Take 100 mg by mouth daily      Lancets (OneTouch Delica Plus [id number]) MISC TAKE 1 BY SUBCUTANEOUS ROUTE 3 TIMES EVERY DAY      lisinopril (ZESTRIL) 10 mg tablet Take 10 mg by mouth daily      Magnesium Glycinate 100 MG CAPS Take 400 mg by mouth      Riboflavin (B-2-400 PO) Take by mouth      rosuvastatin (CRESTOR) 40 MG tablet Take 40 mg by mouth daily      SODIUM FLUORIDE, DENTAL GEL, 1.1 % GEL       Synjardy XR  MG TB24       tamsulosin (FLOMAX) 0.4 mg Take 0.4 mg by mouth daily      Ubrogepant (UBRELVY) 100 MG tablet Take 1 tablet (100 mg) one time as needed for migraine. May repeat one additional tablet (100 mg) at least two hours after the first dose. Do not use more than two doses per day, or for more than eight days per month. 16 tablet 6    vitamin B-12 (CYANOCOBALAMIN) 2500 MCG TABS       fexofenadine (ALLEGRA) 180 MG tablet Take 180 mg by mouth daily (Patient not taking: Reported on 4/30/2024)      Jublia 10 % SOLN APPLY TO AFFECTED TOENAIL(S) BY TOPICAL ROUTE ONCE DAILY (Patient not taking: Reported on 4/30/2024)      memantine (NAMENDA) 10 mg tablet TAKE 1 TABLET BY MOUTH TWICE A DAY (Patient not taking: Reported on 12/30/2024) 180 tablet 1    metFORMIN (GLUCOPHAGE-XR) 500 mg 24 hr tablet 2 (two) times a day       No current facility-administered medications  on file prior to visit.     Allergies   Allergen Reactions    Mixed Ragweed Allergic Rhinitis and Drowsiness      Current Outpatient Medications on File Prior to Visit   Medication Sig Dispense Refill    aspirin (ECOTRIN LOW STRENGTH) 81 mg EC tablet       Cholecalciferol (Vitamin D3) 50 MCG (2000 UT) capsule       Coenzyme Q10 (CoQ10) 200 MG CAPS       Ferrous Bisglycinate Chelate 28 MG CAPS       Januvia 100 MG tablet Take 100 mg by mouth daily      Lancets (OneTouch Delica Plus [id number]) MISC TAKE 1 BY SUBCUTANEOUS ROUTE 3 TIMES EVERY DAY      lisinopril (ZESTRIL) 10 mg tablet Take 10 mg by mouth daily      Magnesium Glycinate 100 MG CAPS Take 400 mg by mouth      Riboflavin (B-2-400 PO) Take by mouth      rosuvastatin (CRESTOR) 40 MG tablet Take 40 mg by mouth daily      SODIUM FLUORIDE, DENTAL GEL, 1.1 % GEL       Synjardy XR  MG TB24       tamsulosin (FLOMAX) 0.4 mg Take 0.4 mg by mouth daily      Ubrogepant (UBRELVY) 100 MG tablet Take 1 tablet (100 mg) one time as needed for migraine. May repeat one additional tablet (100 mg) at least two hours after the first dose. Do not use more than two doses per day, or for more than eight days per month. 16 tablet 6    vitamin B-12 (CYANOCOBALAMIN) 2500 MCG TABS       fexofenadine (ALLEGRA) 180 MG tablet Take 180 mg by mouth daily (Patient not taking: Reported on 4/30/2024)      Jublia 10 % SOLN APPLY TO AFFECTED TOENAIL(S) BY TOPICAL ROUTE ONCE DAILY (Patient not taking: Reported on 4/30/2024)      memantine (NAMENDA) 10 mg tablet TAKE 1 TABLET BY MOUTH TWICE A DAY (Patient not taking: Reported on 12/30/2024) 180 tablet 1    metFORMIN (GLUCOPHAGE-XR) 500 mg 24 hr tablet 2 (two) times a day       No current facility-administered medications on file prior to visit.      Social History     Tobacco Use    Smoking status: Never    Smokeless tobacco: Never   Vaping Use    Vaping status: Never Used   Substance and Sexual Activity    Alcohol use: Not Currently      Comment: One or two drinks a month    Drug use: Never    Sexual activity: Yes     Partners: Female     Birth control/protection: Male Sterilization        Objective   There were no vitals taken for this visit.    Physical Exam  Neurological Exam    Physical Exam:                                                                 Vitals:            Constitutional:    /58 (BP Location: Left arm, Patient Position: Sitting, Cuff Size: Adult)   Pulse 80   Temp 98.3 °F (36.8 °C) (Temporal)   Wt 106 kg (234 lb)   SpO2 95%   BMI 29.25 kg/m²   BP Readings from Last 3 Encounters:   12/30/24 118/58   04/30/24 140/80   08/29/23 130/68     Pulse Readings from Last 3 Encounters:   12/30/24 80   04/30/24 73   08/29/23 79         Well developed, well nourished, well groomed. No dysmorphic features.       Psychiatric:  Normal behavior and appropriate affect        Neurological Examination:     Mental status/cognitive function:   Orientated to time, place and person. Recent and remote memory intact. Attention span and concentration as well as fund of knowledge are appropriate for age. Normal language and spontaneous speech.    Cranial Nerves:  II-visual fields full.   III, IV, VI-Pupils were equal, round, and reactive to light and accomodation. Extraocular movements were full and conjugate without nystagmus. Conjugate gaze, normal smooth pursuits, normal saccades   V-facial sensation symmetric.    VII-facial expression symmetric, intact forehead wrinkle, strong eye closure, symmetric smile    VIII-hearing grossly intact bilaterally   IX, X-palate elevation symmetric, no dysarthria.   XI-shoulder shrug strength intact    XII-tongue protrusion midline.    Motor Exam: symmetric bulk and tone throughout, no pronator drift. Power/strength 5/5 bilateral upper and lower extremities, no atrophy, fasciculations or abnormal movements noted.   Sensory: grossly intact light touch in all extremities.   Reflexes: brachioradialis 2+, biceps  2+, knee 2+ bilaterally  Coordination: Finger nose finger intact bilaterally, no apparent dysmetria, ataxia or tremor noted  Gait: steady casual and tandem gait.      Administrative Statements   I have spent a total time of 20 minutes in caring for this patient on the day of the visit/encounter including Risks and benefits of tx options, Instructions for management, Patient and family education, Importance of tx compliance, Risk factor reductions, Impressions, Counseling / Coordination of care, Documenting in the medical record, Reviewing / ordering tests, medicine, procedures  , and Obtaining or reviewing history  .

## 2024-12-30 NOTE — PATIENT INSTRUCTIONS
Headache Calendar  Please maintain a headache calendar  Consider using phone applications such as Migraine Christopher or [place] Migraine Tracker     Headache/migraine treatment:   Acute medications (for immediate treatment of a headache):   It is ok to take ibuprofen, acetaminophen or naproxen (Advil, Tylenol,  Aleve, Excedrin) if they help your headaches you should limit these to No more than 2-3 times a week to avoid medication overuse/rebound headaches.      For your more moderate to severe migraines take this medication early   - Continue Ubrelvy 100mg tablets    Over the counter preventive supplements for headaches/migraines (if you try, try for 3 months straight)  (to take every day to help prevent headaches - not to take at the time of headache):  [x] Magnesium 400mg daily (If any diarrhea or upset stomach, decrease dose  as tolerated) - oxide or glycinate  [x] Riboflavin (Vitamin B2) 400mg daily - (FYI B2 may make your urine bright/neon yellow)    Lifestyle Recommendations:  [x] SLEEP - Maintain a regular sleep schedule: Adults need at least 7-8 hours of uninterrupted a night. Maintain good sleep hygiene:  Going to bed and waking up at consistent times, avoiding excessive daytime naps, avoiding caffeinated beverages in the evening, avoid excessive stimulation in the evening and generally using bed primarily for sleeping.  One hour before bedtime would recommend turning lights down lower, decreasing your activity (may read quietly, listen to music at a low volume). When you get into bed, should eliminate all technology (no texting, emailing, playing with your phone, iPad or tablet in bed).  [x] HYDRATION - Maintain good hydration.  Drink  2L of fluid a day (4 typical small water bottles)  [x] DIET - Maintain good nutrition. In particular don't skip meals and try and eat healthy balanced meals regularly.  [x] TRIGGERS - Look for other triggers and avoid them: Limit caffeine to 1-2 cups a day or less. Avoid  dietary triggers that you have noticed bring on your headaches (this could include aged cheese, peanuts, MSG, aspartame and nitrates).  [x] EXERCISE - physical exercise as we all know is good for you in many ways, and not only is good for your heart, but also is beneficial for your mental health, cognitive health and  chronic pain/headaches. I would encourage at the least 5 days of physical exercise weekly for at least 30 minutes.      Education and Follow-up  [x] Please call with any questions or concerns. Of course if any new concerning symptoms go to the emergency department.  [x] Follow up in 6 months with Dr. Poole

## 2025-07-02 ENCOUNTER — OFFICE VISIT (OUTPATIENT)
Dept: NEUROLOGY | Facility: CLINIC | Age: 65
End: 2025-07-02
Payer: COMMERCIAL

## 2025-07-02 VITALS
SYSTOLIC BLOOD PRESSURE: 116 MMHG | HEIGHT: 75 IN | WEIGHT: 221 LBS | OXYGEN SATURATION: 98 % | BODY MASS INDEX: 27.48 KG/M2 | TEMPERATURE: 98.7 F | DIASTOLIC BLOOD PRESSURE: 68 MMHG | HEART RATE: 79 BPM

## 2025-07-02 DIAGNOSIS — G47.33 OBSTRUCTIVE SLEEP APNEA (ADULT) (PEDIATRIC): ICD-10-CM

## 2025-07-02 DIAGNOSIS — G43.009 MIGRAINE WITHOUT AURA AND WITHOUT STATUS MIGRAINOSUS, NOT INTRACTABLE: Primary | ICD-10-CM

## 2025-07-02 PROCEDURE — 99213 OFFICE O/P EST LOW 20 MIN: CPT | Performed by: STUDENT IN AN ORGANIZED HEALTH CARE EDUCATION/TRAINING PROGRAM

## 2025-07-02 RX ORDER — SEMAGLUTIDE 0.68 MG/ML
0.5 INJECTION, SOLUTION SUBCUTANEOUS
COMMUNITY
Start: 2025-06-16

## 2025-07-02 NOTE — PROGRESS NOTES
Since your last visit are your headaches Remained the Same    Any change to the headache type? No     What is your current headache frequency (total headache days out of 30): 2/30 (of those, how many are more severe: 0)    Are you taking your current medications as prescribed? yes      Do you have any side effects? no    How may days per week do you take an abortive medicine? 0-1/7

## 2025-07-02 NOTE — PROGRESS NOTES
Neurology Ambulatory Visit  Name: Yan Gorman       : 1960       MRN: [id number]   Encounter Provider: Shiva Poole DO   Encounter Date: 2025  Encounter department: [facility] [place]    Yan Gorman is a delightful 64 y.o. 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     Assessment and Plan  1. Migraine without aura and without status migrainosus, not intractable  Assessment & Plan:  At today's visit, he reports that he is doing very well.  He endorses about 2 headache days per month and does not find them to be severe.  He did have a bit of a flareup between April and May, but attributes that to changes in the weather and allergies.  He may have also been sick at that time.  He would like to continue with magnesium and B2 supplements daily.  From an abortive standpoint, Ubrelvy is typically effective, but he finds it to be less effective if he waits to take it.  I suggested that he take it early at the onset of the more severe episodes and he can always combine it with Tylenol if needed.  2. Obstructive sleep apnea (adult) (pediatric)      He will Return in about 8 months (around 3/2/2026).    Workup  - MRI brain w/ and w/o contrast 2022: No evidence of brain metastasis or acute intracranial abnormality. Unchanged sinonasal posttreatment changes.   - PET scan 10/05/2022: No evidence of neoplastic disease   - MRA head 2022: Normal vessel imaging without any evidence of vascular malformation or aneurysm.   - MRA carotids 2022: No evidence of hemodynamically significant stenosis      Preventative:   - we discussed headache hygiene and lifestyle factors that may improve headaches   -  Mg and B2  - Currently on through other providers: Lisinopril  - Past/ failed/contraindicated: Avoid Topamax due to history of kidney stones, Memantine  - future options: SNRI, CGRP med, Botox      Acute:   - discussed not taking over-the-counter or prescription pain medications more than 3 days per week to prevent medication overuse/rebound headache   - Ubrelvy 100mg   - Currently on through other providers: None   - Past/ failed/contraindicated: None   - future options: Triptans, prochlorperazine, Toradol IM or p.o., could consider trial of 5 days of Depakote 500 mg nightly or dexamethasone 2 mg daily for prolonged migraine, reyvow, nurtec (consider kidney disease), Zavzpret    History of Present Illness       Interval updates:  7/2/25: Since our last visit, he was seen by Eddi in December 2024.  He reported about 6-10 headache days per month.  He preferred to stay on magnesium and B2 supplements alone.  He had stopped memantine.  He was using Ubrelvy as needed for the more severe episodes. At today's visit, he reports no significant change to his headaches.  He currently endorses about 2 headache days per month of which none are severe.  He is using magnesium and B2 supplements daily without any issues.  From an abortive standpoint, he has Ubrelvy and finds it to be effective about 50% of the time.    Prior History  4/30/24: At today's visit, he reports overall improvement in terms of his headaches.  He currently endorses about 1 headache day per month.  He continues to take memantine 10 mg twice daily without any significant issues.  From an abortive standpoint, Ubrelvy tends to work well. He endorses a bit of daytime fatigue with Memantine, but is otherwise doing well.   8/29/23: Since his last visit he reports improvement in terms of his headaches. Currently experiencing about 4-5 headache days per month of which 1 per week may be more severe. Continuing to take Memantine daily without any issues. From an  abortive standpoint, Ubrelvy tends to work well. With regards to sleep he gets about 5-7 hours per night. Using his CPAP nightly and on review of his AHI it appears that his machine is working well. In terms of memory, he reports issues every once in a while, but overall stable since our prior visit.  2/28/23: Since his last visit, he reports that his headaches have improved.  Currently experiencing about 3 headache days per month. Tolerating Memantine without any issues. Ubrelvy works when needed - has not used it much.  11/3/22:  Mr. Gorman reports a history of headaches that started about 5-6 months ago.  He denies any prior history of headaches but reports that since that time he has had a nearly daily headache with significant flare-ups.  He reported a significant positional component to the headaches and noted worsening with coughing, sneezing, bending over, or bearing down.  He underwent an MRI of the brain with and without contrast at [facility] which did not show any evidence of metastatic disease or primary brain tumor.  He also underwent a PET scan that did not show any evidence of neoplastic disease.  Certainly the multiple red flags and positional component the headaches are concerning but it is reassuring that he had a normal MRI and normal dilated eye exam.  However, I would like to obtain further imaging to evaluate for any vascular lesions.  The description of his headaches does have a migrainous quality to them and for that reason I would like to treat him.  From an abortive standpoint I will have him try Ubrelvy. I have asked him to reach out to his cardiologist to discuss the use of triptans as the previously mentioned a high calcium score and was being followed for suspected cardiac risks.  From a preventive standpoint, I will have him try memantine especially since he reports issues with his memory and is concerned for dementia due to a strong family history of dementia on his mother's  "side.  Ideally I would like him to take 10 mg in the morning and 10 mg in the evening but due to kidney disease since his chemotherapy I will have him discuss an elevated dose with his nephrologist on Monday, otherwise we will attempt to keep him on 5 mg b.i.d..  I have asked him to keep me updated throughout the process and let me know if there are any issues or concerns.        Objective     Physical Exam:                                                               Vitals:            Constitutional:    /68 (BP Location: Left arm, Patient Position: Sitting, Cuff Size: Large)   Pulse 79   Temp 98.7 °F (37.1 °C) (Temporal)   Ht 6' 3\" (1.905 m)   Wt 100 kg (221 lb)   SpO2 98%   BMI 27.62 kg/m²   BP Readings from Last 3 Encounters:   07/02/25 116/68   12/30/24 118/58   04/30/24 140/80     Pulse Readings from Last 3 Encounters:   07/02/25 79   12/30/24 80   04/30/24 73         Well developed, well nourished, well groomed. No dysmorphic features.       HEENT:  Normocephalic atraumatic. See neuro exam   Chest:  Respirations appear regular and unlabored.    Cardiovascular:  no observed significant swelling.    Musculoskeletal:  (see below under neurologic exam for evaluation of motor function and gait)   Skin:  warm and dry, not diaphoretic.    Psychiatric:  Normal behavior and appropriate affect       Neurological Examination:     Mental status/cognitive function:   Recent and remote memory intact. Attention span and concentration as well as fund of knowledge are appropriate for age. Normal language and spontaneous speech.  Cranial Nerves:  III, IV, VI-Pupils were equal, round. Extraocular movements were full and conjugate   VII-facial expression symmetric  VIII-hearing grossly intact bilaterally   Motor Exam: symmetric bulk throughout. no atrophy, fasciculations or abnormal movements noted.   Coordination:  no apparent dysmetria, ataxia or tremor noted  Gait: steady casual gait      Voice recognition " software was used in the generation of this note. There may be unintentional errors including grammatical errors, spelling errors, or pronoun errors.

## 2025-07-02 NOTE — ASSESSMENT & PLAN NOTE
At today's visit, he reports that he is doing very well.  He endorses about 2 headache days per month and does not find them to be severe.  He did have a bit of a flareup between April and May, but attributes that to changes in the weather and allergies.  He may have also been sick at that time.  He would like to continue with magnesium and B2 supplements daily.  From an abortive standpoint, Ubrelvy is typically effective, but he finds it to be less effective if he waits to take it.  I suggested that he take it early at the onset of the more severe episodes and he can always combine it with Tylenol if needed.

## 2025-07-02 NOTE — PATIENT INSTRUCTIONS
Headache Calendar  Please maintain a headache calendar  Consider using phone applications such as Migraine [employer] or [place] Migraine Tracker     Headache/migraine treatment:   Acute medications (for immediate treatment of a headache):   It is ok to take ibuprofen, acetaminophen or naproxen (Advil, Tylenol,  Aleve, Excedrin) if they help your headaches you should limit these to No more than 2-3 times a week to avoid medication overuse/rebound headaches.      For your more moderate to severe migraines take this medication early   - Continue Ubrelvy 100mg tablets    Over the counter preventive supplements for headaches/migraines  [x] Magnesium 400mg daily (If any diarrhea or upset stomach, decrease dose  as tolerated) - oxide or glycinate  [x] Riboflavin (Vitamin B2) 400mg daily - (FYI B2 may make your urine bright/neon yellow)    Lifestyle Recommendations:  [x] SLEEP - Maintain a regular sleep schedule: Adults need at least 7-8 hours of uninterrupted a night. Maintain good sleep hygiene:  Going to bed and waking up at consistent times, avoiding excessive daytime naps, avoiding caffeinated beverages in the evening, avoid excessive stimulation in the evening and generally using bed primarily for sleeping.  One hour before bedtime would recommend turning lights down lower, decreasing your activity (may read quietly, listen to music at a low volume). When you get into bed, should eliminate all technology (no texting, emailing, playing with your phone, iPad or tablet in bed).  [x] HYDRATION - Maintain good hydration.  Drink  2L of fluid a day (4 typical small water bottles)  [x] DIET - Maintain good nutrition. In particular don't skip meals and try and eat healthy balanced meals regularly.  [x] TRIGGERS - Look for other triggers and avoid them: Limit caffeine to 1-2 cups a day or less. Avoid dietary triggers that you have noticed bring on your headaches (this could include aged cheese, peanuts, MSG, aspartame and  nitrates).  [x] EXERCISE - physical exercise as we all know is good for you in many ways, and not only is good for your heart, but also is beneficial for your mental health, cognitive health and  chronic pain/headaches. I would encourage at the least 5 days of physical exercise weekly for at least 30 minutes.      Education and Follow-up  [x] Please call with any questions or concerns. Of course if any new concerning symptoms go to the emergency department.  [x] Follow up in 8 months

## 2025-08-15 ENCOUNTER — TELEPHONE (OUTPATIENT)
Dept: NEUROLOGY | Facility: CLINIC | Age: 65
End: 2025-08-15

## (undated) RX ORDER — PREDNISOLONE ACETATE 10 MG/ML: 1 SUSPENSION/ DROPS OPHTHALMIC